# Patient Record
Sex: FEMALE | Race: WHITE | Employment: OTHER | URBAN - METROPOLITAN AREA
[De-identification: names, ages, dates, MRNs, and addresses within clinical notes are randomized per-mention and may not be internally consistent; named-entity substitution may affect disease eponyms.]

---

## 2018-03-14 ENCOUNTER — HOSPITAL ENCOUNTER (OUTPATIENT)
Dept: GENERAL RADIOLOGY | Age: 83
Discharge: HOME | End: 2018-03-14
Attending: INTERNAL MEDICINE | Admitting: INTERNAL MEDICINE

## 2018-03-14 DIAGNOSIS — R06.02 BREATH SHORTNESS: ICD-10-CM

## 2018-03-20 ENCOUNTER — HOSPITAL ENCOUNTER (OUTPATIENT)
Dept: PULMONOLOGY | Age: 83
Discharge: HOME | End: 2018-03-20
Attending: INTERNAL MEDICINE | Admitting: INTERNAL MEDICINE

## 2018-03-20 DIAGNOSIS — R06.02 SHORTNESS OF BREATH: ICD-10-CM

## 2018-11-19 ENCOUNTER — HOSPITAL ENCOUNTER (INPATIENT)
Age: 83
LOS: 2 days | Discharge: HOME | DRG: 291 | End: 2018-11-21
Attending: EMERGENCY MEDICINE | Admitting: GENERAL PRACTICE
Payer: COMMERCIAL

## 2018-11-19 ENCOUNTER — APPOINTMENT (OUTPATIENT)
Dept: GENERAL RADIOLOGY | Age: 83
DRG: 291 | End: 2018-11-19
Payer: COMMERCIAL

## 2018-11-19 DIAGNOSIS — J90 PLEURAL EFFUSION, BILATERAL: ICD-10-CM

## 2018-11-19 DIAGNOSIS — R09.02 HYPOXIA: ICD-10-CM

## 2018-11-19 DIAGNOSIS — R06.02 SHORTNESS OF BREATH: Primary | ICD-10-CM

## 2018-11-19 LAB
ALBUMIN SERPL-MCNC: 3.8 GM/DL (ref 3.4–5)
ALP BLD-CCNC: 92 IU/L (ref 40–128)
ALT SERPL-CCNC: 14 U/L (ref 10–40)
ANION GAP SERPL CALCULATED.3IONS-SCNC: 9 MMOL/L (ref 4–16)
AST SERPL-CCNC: 22 IU/L (ref 15–37)
BASOPHILS ABSOLUTE: 0 K/CU MM
BASOPHILS RELATIVE PERCENT: 0.5 % (ref 0–1)
BILIRUB SERPL-MCNC: 1.2 MG/DL (ref 0–1)
BUN BLDV-MCNC: 20 MG/DL (ref 6–23)
CALCIUM SERPL-MCNC: 9 MG/DL (ref 8.3–10.6)
CHLORIDE BLD-SCNC: 100 MMOL/L (ref 99–110)
CO2: 29 MMOL/L (ref 21–32)
CREAT SERPL-MCNC: 0.9 MG/DL (ref 0.6–1.1)
DIFFERENTIAL TYPE: ABNORMAL
EOSINOPHILS ABSOLUTE: 0.1 K/CU MM
EOSINOPHILS RELATIVE PERCENT: 2 % (ref 0–3)
GFR AFRICAN AMERICAN: >60 ML/MIN/1.73M2
GFR NON-AFRICAN AMERICAN: 58 ML/MIN/1.73M2
GLUCOSE BLD-MCNC: 96 MG/DL (ref 70–99)
HCT VFR BLD CALC: 36.4 % (ref 37–47)
HEMOGLOBIN: 11.7 GM/DL (ref 12.5–16)
IMMATURE NEUTROPHIL %: 0.2 % (ref 0–0.43)
LYMPHOCYTES ABSOLUTE: 1.2 K/CU MM
LYMPHOCYTES RELATIVE PERCENT: 22.5 % (ref 24–44)
MAGNESIUM: 2 MG/DL (ref 1.8–2.4)
MCH RBC QN AUTO: 30.2 PG (ref 27–31)
MCHC RBC AUTO-ENTMCNC: 32.1 % (ref 32–36)
MCV RBC AUTO: 93.8 FL (ref 78–100)
MONOCYTES ABSOLUTE: 0.5 K/CU MM
MONOCYTES RELATIVE PERCENT: 8.9 % (ref 0–4)
NUCLEATED RBC %: 0 %
PDW BLD-RTO: 13.2 % (ref 11.7–14.9)
PLATELET # BLD: 177 K/CU MM (ref 140–440)
PMV BLD AUTO: 9.3 FL (ref 7.5–11.1)
POTASSIUM SERPL-SCNC: 4.6 MMOL/L (ref 3.5–5.1)
PRO-BNP: 3705 PG/ML
RBC # BLD: 3.88 M/CU MM (ref 4.2–5.4)
SEGMENTED NEUTROPHILS ABSOLUTE COUNT: 3.6 K/CU MM
SEGMENTED NEUTROPHILS RELATIVE PERCENT: 65.9 % (ref 36–66)
SODIUM BLD-SCNC: 138 MMOL/L (ref 135–145)
TOTAL IMMATURE NEUTOROPHIL: 0.01 K/CU MM
TOTAL NUCLEATED RBC: 0 K/CU MM
TOTAL PROTEIN: 6.2 GM/DL (ref 6.4–8.2)
TROPONIN T: <0.01 NG/ML
WBC # BLD: 5.5 K/CU MM (ref 4–10.5)

## 2018-11-19 PROCEDURE — 2580000003 HC RX 258: Performed by: GENERAL PRACTICE

## 2018-11-19 PROCEDURE — 99285 EMERGENCY DEPT VISIT HI MDM: CPT

## 2018-11-19 PROCEDURE — 96374 THER/PROPH/DIAG INJ IV PUSH: CPT

## 2018-11-19 PROCEDURE — 94761 N-INVAS EAR/PLS OXIMETRY MLT: CPT

## 2018-11-19 PROCEDURE — 6370000000 HC RX 637 (ALT 250 FOR IP): Performed by: GENERAL PRACTICE

## 2018-11-19 PROCEDURE — 2700000000 HC OXYGEN THERAPY PER DAY

## 2018-11-19 PROCEDURE — 85025 COMPLETE CBC W/AUTO DIFF WBC: CPT

## 2018-11-19 PROCEDURE — 80053 COMPREHEN METABOLIC PANEL: CPT

## 2018-11-19 PROCEDURE — 1200000000 HC SEMI PRIVATE

## 2018-11-19 PROCEDURE — 84484 ASSAY OF TROPONIN QUANT: CPT

## 2018-11-19 PROCEDURE — 71046 X-RAY EXAM CHEST 2 VIEWS: CPT

## 2018-11-19 PROCEDURE — 83735 ASSAY OF MAGNESIUM: CPT

## 2018-11-19 PROCEDURE — 36415 COLL VENOUS BLD VENIPUNCTURE: CPT

## 2018-11-19 PROCEDURE — 83880 ASSAY OF NATRIURETIC PEPTIDE: CPT

## 2018-11-19 PROCEDURE — 93005 ELECTROCARDIOGRAM TRACING: CPT | Performed by: EMERGENCY MEDICINE

## 2018-11-19 PROCEDURE — 6360000002 HC RX W HCPCS: Performed by: EMERGENCY MEDICINE

## 2018-11-19 RX ORDER — FUROSEMIDE 10 MG/ML
20 INJECTION INTRAMUSCULAR; INTRAVENOUS 2 TIMES DAILY
Status: DISCONTINUED | OUTPATIENT
Start: 2018-11-19 | End: 2018-11-21

## 2018-11-19 RX ORDER — FUROSEMIDE 10 MG/ML
20 INJECTION INTRAMUSCULAR; INTRAVENOUS ONCE
Status: COMPLETED | OUTPATIENT
Start: 2018-11-19 | End: 2018-11-19

## 2018-11-19 RX ORDER — PROPRANOLOL HYDROCHLORIDE 40 MG/1
80 TABLET ORAL ONCE
Status: DISCONTINUED | OUTPATIENT
Start: 2018-11-19 | End: 2018-11-20

## 2018-11-19 RX ORDER — MECLIZINE HYDROCHLORIDE 25 MG/1
12.5 TABLET ORAL 3 TIMES DAILY PRN
Status: DISCONTINUED | OUTPATIENT
Start: 2018-11-19 | End: 2018-11-21 | Stop reason: HOSPADM

## 2018-11-19 RX ORDER — CITALOPRAM 20 MG/1
20 TABLET ORAL DAILY
Status: DISCONTINUED | OUTPATIENT
Start: 2018-11-20 | End: 2018-11-21 | Stop reason: HOSPADM

## 2018-11-19 RX ORDER — ACETAMINOPHEN 325 MG/1
650 TABLET ORAL EVERY 6 HOURS PRN
Status: DISCONTINUED | OUTPATIENT
Start: 2018-11-19 | End: 2018-11-21 | Stop reason: HOSPADM

## 2018-11-19 RX ORDER — SODIUM CHLORIDE 0.9 % (FLUSH) 0.9 %
10 SYRINGE (ML) INJECTION EVERY 12 HOURS SCHEDULED
Status: DISCONTINUED | OUTPATIENT
Start: 2018-11-19 | End: 2018-11-21 | Stop reason: HOSPADM

## 2018-11-19 RX ORDER — LORAZEPAM 0.5 MG/1
0.5 TABLET ORAL 2 TIMES DAILY PRN
Status: DISCONTINUED | OUTPATIENT
Start: 2018-11-19 | End: 2018-11-21 | Stop reason: HOSPADM

## 2018-11-19 RX ORDER — DOCUSATE SODIUM 100 MG/1
100 CAPSULE, LIQUID FILLED ORAL 2 TIMES DAILY PRN
Status: DISCONTINUED | OUTPATIENT
Start: 2018-11-19 | End: 2018-11-21 | Stop reason: HOSPADM

## 2018-11-19 RX ORDER — ONDANSETRON 2 MG/ML
4 INJECTION INTRAMUSCULAR; INTRAVENOUS EVERY 6 HOURS PRN
Status: DISCONTINUED | OUTPATIENT
Start: 2018-11-19 | End: 2018-11-21 | Stop reason: HOSPADM

## 2018-11-19 RX ORDER — PANTOPRAZOLE SODIUM 40 MG/1
40 TABLET, DELAYED RELEASE ORAL
Status: DISCONTINUED | OUTPATIENT
Start: 2018-11-20 | End: 2018-11-21 | Stop reason: HOSPADM

## 2018-11-19 RX ORDER — FUROSEMIDE 20 MG/1
20 TABLET ORAL
Status: ON HOLD | COMMUNITY
End: 2018-11-20

## 2018-11-19 RX ORDER — PROPRANOLOL HYDROCHLORIDE 80 MG/1
80 TABLET ORAL ONCE
Status: ON HOLD | COMMUNITY
End: 2019-03-15 | Stop reason: HOSPADM

## 2018-11-19 RX ORDER — SODIUM CHLORIDE 0.9 % (FLUSH) 0.9 %
10 SYRINGE (ML) INJECTION PRN
Status: DISCONTINUED | OUTPATIENT
Start: 2018-11-19 | End: 2018-11-21 | Stop reason: HOSPADM

## 2018-11-19 RX ADMIN — FUROSEMIDE 20 MG: 10 INJECTION, SOLUTION INTRAVENOUS at 19:51

## 2018-11-19 RX ADMIN — RIVAROXABAN 10 MG: 10 TABLET, FILM COATED ORAL at 22:49

## 2018-11-19 RX ADMIN — LORAZEPAM 0.5 MG: 0.5 TABLET ORAL at 22:49

## 2018-11-19 RX ADMIN — SODIUM CHLORIDE, PRESERVATIVE FREE 10 ML: 5 INJECTION INTRAVENOUS at 22:50

## 2018-11-19 NOTE — ED NOTES
Pt. Arrived by EMS from home with SOB. Pt family states she has been anxious lately due to another family members health issues. Pt. Family states that she has gained 9lbs in 2 weeks due to medication changes. Pt. States she was shaky and her PCP changed her medications. Family records for pt:  11/12- med change due to shakes  8/30- Ultrasound of heart   10/2 Holter Monitor   10/18 med change  10/30- off bp meds    Current med list in pt. Bin in room.       Ovidio Sosa RN  11/19/18 7892

## 2018-11-20 LAB
ALBUMIN SERPL-MCNC: 3.5 GM/DL (ref 3.4–5)
ALP BLD-CCNC: 86 IU/L (ref 40–128)
ALT SERPL-CCNC: 11 U/L (ref 10–40)
ANION GAP SERPL CALCULATED.3IONS-SCNC: 9 MMOL/L (ref 4–16)
AST SERPL-CCNC: 16 IU/L (ref 15–37)
BASOPHILS ABSOLUTE: 0 K/CU MM
BASOPHILS RELATIVE PERCENT: 0.8 % (ref 0–1)
BILIRUB SERPL-MCNC: 1.4 MG/DL (ref 0–1)
BUN BLDV-MCNC: 16 MG/DL (ref 6–23)
CALCIUM SERPL-MCNC: 8.2 MG/DL (ref 8.3–10.6)
CHLORIDE BLD-SCNC: 102 MMOL/L (ref 99–110)
CO2: 32 MMOL/L (ref 21–32)
CREAT SERPL-MCNC: 0.9 MG/DL (ref 0.6–1.1)
DIFFERENTIAL TYPE: ABNORMAL
EKG ATRIAL RATE: 98 BPM
EKG DIAGNOSIS: NORMAL
EKG Q-T INTERVAL: 362 MS
EKG QRS DURATION: 102 MS
EKG QTC CALCULATION (BAZETT): 450 MS
EKG R AXIS: 35 DEGREES
EKG T AXIS: -18 DEGREES
EKG VENTRICULAR RATE: 93 BPM
EOSINOPHILS ABSOLUTE: 0.1 K/CU MM
EOSINOPHILS RELATIVE PERCENT: 3 % (ref 0–3)
GFR AFRICAN AMERICAN: >60 ML/MIN/1.73M2
GFR NON-AFRICAN AMERICAN: 58 ML/MIN/1.73M2
GLUCOSE BLD-MCNC: 83 MG/DL (ref 70–99)
HCT VFR BLD CALC: 32.2 % (ref 37–47)
HEMOGLOBIN: 10.5 GM/DL (ref 12.5–16)
IMMATURE NEUTROPHIL %: 0.3 % (ref 0–0.43)
LV EF: 48 %
LVEF MODALITY: NORMAL
LYMPHOCYTES ABSOLUTE: 1.2 K/CU MM
LYMPHOCYTES RELATIVE PERCENT: 32.4 % (ref 24–44)
MCH RBC QN AUTO: 30.6 PG (ref 27–31)
MCHC RBC AUTO-ENTMCNC: 32.6 % (ref 32–36)
MCV RBC AUTO: 93.9 FL (ref 78–100)
MONOCYTES ABSOLUTE: 0.5 K/CU MM
MONOCYTES RELATIVE PERCENT: 12.3 % (ref 0–4)
NUCLEATED RBC %: 0 %
PDW BLD-RTO: 13.2 % (ref 11.7–14.9)
PLATELET # BLD: 151 K/CU MM (ref 140–440)
PMV BLD AUTO: 9.4 FL (ref 7.5–11.1)
POTASSIUM SERPL-SCNC: 4.1 MMOL/L (ref 3.5–5.1)
RBC # BLD: 3.43 M/CU MM (ref 4.2–5.4)
SEGMENTED NEUTROPHILS ABSOLUTE COUNT: 1.9 K/CU MM
SEGMENTED NEUTROPHILS RELATIVE PERCENT: 51.2 % (ref 36–66)
SODIUM BLD-SCNC: 143 MMOL/L (ref 135–145)
TOTAL IMMATURE NEUTOROPHIL: 0.01 K/CU MM
TOTAL NUCLEATED RBC: 0 K/CU MM
TOTAL PROTEIN: 5 GM/DL (ref 6.4–8.2)
WBC # BLD: 3.7 K/CU MM (ref 4–10.5)

## 2018-11-20 PROCEDURE — 36415 COLL VENOUS BLD VENIPUNCTURE: CPT

## 2018-11-20 PROCEDURE — 6370000000 HC RX 637 (ALT 250 FOR IP): Performed by: INTERNAL MEDICINE

## 2018-11-20 PROCEDURE — 93306 TTE W/DOPPLER COMPLETE: CPT

## 2018-11-20 PROCEDURE — 85025 COMPLETE CBC W/AUTO DIFF WBC: CPT

## 2018-11-20 PROCEDURE — 2700000000 HC OXYGEN THERAPY PER DAY

## 2018-11-20 PROCEDURE — 94761 N-INVAS EAR/PLS OXIMETRY MLT: CPT

## 2018-11-20 PROCEDURE — 93226 XTRNL ECG REC<48 HR SCAN A/R: CPT

## 2018-11-20 PROCEDURE — 2580000003 HC RX 258: Performed by: GENERAL PRACTICE

## 2018-11-20 PROCEDURE — 6360000002 HC RX W HCPCS: Performed by: GENERAL PRACTICE

## 2018-11-20 PROCEDURE — 6370000000 HC RX 637 (ALT 250 FOR IP): Performed by: GENERAL PRACTICE

## 2018-11-20 PROCEDURE — 80053 COMPREHEN METABOLIC PANEL: CPT

## 2018-11-20 PROCEDURE — 1200000000 HC SEMI PRIVATE

## 2018-11-20 PROCEDURE — 93225 XTRNL ECG REC<48 HRS REC: CPT

## 2018-11-20 RX ORDER — FUROSEMIDE 20 MG/1
20 TABLET ORAL 2 TIMES DAILY
Qty: 60 TABLET | Refills: 3
Start: 2018-11-20

## 2018-11-20 RX ORDER — PROPRANOLOL HYDROCHLORIDE 80 MG/1
80 CAPSULE, EXTENDED RELEASE ORAL DAILY
Status: DISCONTINUED | OUTPATIENT
Start: 2018-11-20 | End: 2018-11-21 | Stop reason: HOSPADM

## 2018-11-20 RX ADMIN — FUROSEMIDE 20 MG: 10 INJECTION, SOLUTION INTRAVENOUS at 08:48

## 2018-11-20 RX ADMIN — RIVAROXABAN 10 MG: 10 TABLET, FILM COATED ORAL at 18:43

## 2018-11-20 RX ADMIN — PROPRANOLOL HYDROCHLORIDE 80 MG: 80 CAPSULE, EXTENDED RELEASE ORAL at 14:33

## 2018-11-20 RX ADMIN — SODIUM CHLORIDE, PRESERVATIVE FREE 10 ML: 5 INJECTION INTRAVENOUS at 08:48

## 2018-11-20 RX ADMIN — PANTOPRAZOLE SODIUM 40 MG: 40 TABLET, DELAYED RELEASE ORAL at 08:48

## 2018-11-20 RX ADMIN — CITALOPRAM HYDROBROMIDE 20 MG: 20 TABLET ORAL at 08:48

## 2018-11-20 RX ADMIN — SODIUM CHLORIDE, PRESERVATIVE FREE 10 ML: 5 INJECTION INTRAVENOUS at 21:34

## 2018-11-20 RX ADMIN — FUROSEMIDE 20 MG: 10 INJECTION, SOLUTION INTRAVENOUS at 18:42

## 2018-11-20 ASSESSMENT — PAIN SCALES - GENERAL
PAINLEVEL_OUTOF10: 0

## 2018-11-20 NOTE — CONSULTS
done.    SOCIAL HISTORY:  Does not smoke, does not drink. She lives with family  at home. ALLERGIES:  BIAXIN. PHYSICAL EXAMINATION:  GENERAL:  The patient is awake, alert, answering questions. VITAL SIGNS:  Temperature is afebrile, pulse is in 80s to 100, blood  pressure one teens present. HEENT:  Head is normocephalic, atraumatic. Pupils are equal and  reactive to light. CHEST:  Equal expansion. LUNGS:  Clear to auscultation. No wheezing or rhonchi. HEART:  Irregularly irregular. ABDOMEN:  Soft, nontender. Bowel sounds are present. No  hepatosplenomegaly, guarding or rigidity. EXTREMITIES:  No cyanosis or clubbing noted. NEUROLOGIC:  Cranial nerves II through XII are grossly intact. DIAGNOSTIC DATA:  Electrolytes are normal, BUN is 16, creatinine 0.9. Troponin is negative. BNP 3700. LFTs are normal.  CBC is within normal  range. Chest x-ray, COPD with small pleural effusion present. IMPRESSION AND PLAN:  This is a 80-year-old female patient with a  history of moderate-to-severe aortic stenosis noted, history of having  atrial fibrillation, she comes in with shortness of breath present,  getting progressively short of breath associated with exertion, possibly  secondary to aortic stenosis itself and/or AFib with RVR present. At  this time, the plan is rate control and I ordered an echo and a Holter  monitor. I will review that and will adjust her medications. The plan  is for conservative treatment.         Sabine Wasserman MD    D: 11/20/2018 10:37:43       T: 11/20/2018 11:19:22     [email or web address]  Job#: [id number]     Doc#: [id number]    CC:

## 2018-11-20 NOTE — CARE COORDINATION
Pt has med  initial Inpatient Admission criteria for dx Hypoxia. Waiting additional results and provider documentation to further clarify admission criteria.  BOZENA, RN/CM

## 2018-11-21 VITALS
TEMPERATURE: 98.3 F | OXYGEN SATURATION: 93 % | HEART RATE: 107 BPM | BODY MASS INDEX: 26.31 KG/M2 | HEIGHT: 62 IN | WEIGHT: 143 LBS | SYSTOLIC BLOOD PRESSURE: 126 MMHG | DIASTOLIC BLOOD PRESSURE: 68 MMHG | RESPIRATION RATE: 16 BRPM

## 2018-11-21 PROBLEM — I50.31 ACUTE DIASTOLIC (CONGESTIVE) HEART FAILURE (HCC): Status: ACTIVE | Noted: 2018-11-21

## 2018-11-21 PROCEDURE — 94761 N-INVAS EAR/PLS OXIMETRY MLT: CPT

## 2018-11-21 PROCEDURE — 2580000003 HC RX 258: Performed by: GENERAL PRACTICE

## 2018-11-21 PROCEDURE — 6370000000 HC RX 637 (ALT 250 FOR IP): Performed by: GENERAL PRACTICE

## 2018-11-21 PROCEDURE — 2700000000 HC OXYGEN THERAPY PER DAY

## 2018-11-21 PROCEDURE — 6370000000 HC RX 637 (ALT 250 FOR IP): Performed by: INTERNAL MEDICINE

## 2018-11-21 RX ORDER — POTASSIUM CHLORIDE 20 MEQ/1
10 TABLET, EXTENDED RELEASE ORAL DAILY
Status: DISCONTINUED | OUTPATIENT
Start: 2018-11-21 | End: 2018-11-21 | Stop reason: HOSPADM

## 2018-11-21 RX ORDER — FUROSEMIDE 40 MG/1
40 TABLET ORAL DAILY
Status: DISCONTINUED | OUTPATIENT
Start: 2018-11-21 | End: 2018-11-21 | Stop reason: HOSPADM

## 2018-11-21 RX ADMIN — CITALOPRAM HYDROBROMIDE 20 MG: 20 TABLET ORAL at 11:17

## 2018-11-21 RX ADMIN — PANTOPRAZOLE SODIUM 40 MG: 40 TABLET, DELAYED RELEASE ORAL at 06:30

## 2018-11-21 RX ADMIN — FUROSEMIDE 40 MG: 40 TABLET ORAL at 11:17

## 2018-11-21 RX ADMIN — PROPRANOLOL HYDROCHLORIDE 80 MG: 80 CAPSULE, EXTENDED RELEASE ORAL at 11:21

## 2018-11-21 RX ADMIN — SODIUM CHLORIDE, PRESERVATIVE FREE 10 ML: 5 INJECTION INTRAVENOUS at 11:20

## 2018-11-21 RX ADMIN — POTASSIUM CHLORIDE 10 MEQ: 20 TABLET, EXTENDED RELEASE ORAL at 11:17

## 2018-11-21 ASSESSMENT — PAIN SCALES - GENERAL: PAINLEVEL_OUTOF10: 0

## 2018-11-21 NOTE — PLAN OF CARE
Problem: Falls - Risk of:  Goal: Will remain free from falls  Will remain free from falls   Outcome: Ongoing    Goal: Absence of physical injury  Absence of physical injury   Outcome: Ongoing      Problem: Airway Clearance - Ineffective:  Goal: Ability to maintain a clear airway will improve  Ability to maintain a clear airway will improve  Outcome: Ongoing

## 2018-11-21 NOTE — PROGRESS NOTES
Daily Progress Note    patient is awake alert feeling ok  No chest pain, heart rate and BP stable  afib rate stable   She is being evaluated for home oxygen  She had issues with Toprol  But is tolerating Inderal -changed to long acting   Moderate AS --conservative treatment  Keep on diuretics  On AC for afib    Echo -Summary   Left ventricular function is low normal , EF is estimated at 45-50%.  Mild concentric left ventricular hypertrophy.   Mildly dilated left atrium.   Right ventricular systolic pressure of 49 mm Hg suggestive of mild pulmonary   hypertension.   Sclerotic aortic valve with moderate stenosis.   peak velocity 3.34 m/sec-mean PG is 26mm Hg   Mild to moderate aortic insufficiency with PHT of 426 msec.   Mild to moderate mitral regurgitation is present.   Mild tricuspid regurgitation.   No evidence of any pericardial effusion.       Stress test was done in 08/2016, negative for ischemia, LV function was preserved at that time.     At that time, her metoprolol was stopped because that was making her tachycardic and she was placed on Cardizem 120 mg once a day.     PAST MEDICAL HISTORY:  Aortic stenosis, moderate to severe; history of  atrial fibrillation present. History of hypertension present.     MEDICATIONS AT HOME:  She is on Inderal 80 mg once a day, Xarelto 10 mg  a day, Celexa, Ativan, Prilosec and Antivert.       Objective:   /72   Pulse 77   Temp 97.7 °F (36.5 °C) (Oral)   Resp 16   Ht 5' 2\" (1.575 m)   Wt 143 lb (64.9 kg)   SpO2 94%   BMI 26.16 kg/m²     Intake/Output Summary (Last 24 hours) at 11/21/18 0946  Last data filed at 11/20/18 1506   Gross per 24 hour   Intake              360 ml   Output                0 ml   Net              360 ml       Medications:   Scheduled Meds:   propranolol  80 mg Oral Daily    citalopram  20 mg Oral Daily    pantoprazole  40 mg Oral QAM AC    rivaroxaban  10 mg Oral Daily    sodium chloride flush  10 mL Intravenous 2 times per day

## 2019-03-14 ENCOUNTER — HOSPITAL ENCOUNTER (OUTPATIENT)
Age: 84
Setting detail: OBSERVATION
Discharge: HOME | End: 2019-03-15
Attending: EMERGENCY MEDICINE | Admitting: GENERAL PRACTICE
Payer: COMMERCIAL

## 2019-03-14 ENCOUNTER — APPOINTMENT (OUTPATIENT)
Dept: GENERAL RADIOLOGY | Age: 84
End: 2019-03-14
Payer: COMMERCIAL

## 2019-03-14 DIAGNOSIS — R74.01 TRANSAMINITIS: ICD-10-CM

## 2019-03-14 DIAGNOSIS — R06.02 SHORTNESS OF BREATH: Primary | ICD-10-CM

## 2019-03-14 PROBLEM — R06.00 DYSPNEA: Status: ACTIVE | Noted: 2019-03-14

## 2019-03-14 LAB
ALBUMIN SERPL-MCNC: 3.9 GM/DL (ref 3.4–5)
ALP BLD-CCNC: 247 IU/L (ref 40–129)
ALT SERPL-CCNC: 115 U/L (ref 10–40)
ANION GAP SERPL CALCULATED.3IONS-SCNC: 7 MMOL/L (ref 4–16)
AST SERPL-CCNC: 81 IU/L (ref 15–37)
BASOPHILS ABSOLUTE: 0 K/CU MM
BASOPHILS RELATIVE PERCENT: 0.5 % (ref 0–1)
BILIRUB SERPL-MCNC: 1.8 MG/DL (ref 0–1)
BUN BLDV-MCNC: 17 MG/DL (ref 6–23)
CALCIUM SERPL-MCNC: 8.3 MG/DL (ref 8.3–10.6)
CHLORIDE BLD-SCNC: 101 MMOL/L (ref 99–110)
CO2: 31 MMOL/L (ref 21–32)
CREAT SERPL-MCNC: 0.9 MG/DL (ref 0.6–1.1)
DIFFERENTIAL TYPE: ABNORMAL
EOSINOPHILS ABSOLUTE: 0.2 K/CU MM
EOSINOPHILS RELATIVE PERCENT: 2.5 % (ref 0–3)
GFR AFRICAN AMERICAN: >60 ML/MIN/1.73M2
GFR NON-AFRICAN AMERICAN: 58 ML/MIN/1.73M2
GLUCOSE BLD-MCNC: 96 MG/DL (ref 70–99)
HCT VFR BLD CALC: 39.2 % (ref 37–47)
HEMOGLOBIN: 11.9 GM/DL (ref 12.5–16)
IMMATURE NEUTROPHIL %: 0.5 % (ref 0–0.43)
LYMPHOCYTES ABSOLUTE: 1 K/CU MM
LYMPHOCYTES RELATIVE PERCENT: 15.1 % (ref 24–44)
MAGNESIUM: 1.8 MG/DL (ref 1.8–2.4)
MCH RBC QN AUTO: 28.7 PG (ref 27–31)
MCHC RBC AUTO-ENTMCNC: 30.4 % (ref 32–36)
MCV RBC AUTO: 94.7 FL (ref 78–100)
MONOCYTES ABSOLUTE: 0.5 K/CU MM
MONOCYTES RELATIVE PERCENT: 8.2 % (ref 0–4)
NUCLEATED RBC %: 0 %
PDW BLD-RTO: 13.5 % (ref 11.7–14.9)
PLATELET # BLD: 160 K/CU MM (ref 140–440)
PMV BLD AUTO: 9.8 FL (ref 7.5–11.1)
POTASSIUM SERPL-SCNC: 4.4 MMOL/L (ref 3.5–5.1)
PRO-BNP: 3439 PG/ML
RBC # BLD: 4.14 M/CU MM (ref 4.2–5.4)
SEGMENTED NEUTROPHILS ABSOLUTE COUNT: 4.8 K/CU MM
SEGMENTED NEUTROPHILS RELATIVE PERCENT: 73.2 % (ref 36–66)
SODIUM BLD-SCNC: 139 MMOL/L (ref 135–145)
TOTAL IMMATURE NEUTOROPHIL: 0.03 K/CU MM
TOTAL NUCLEATED RBC: 0 K/CU MM
TOTAL PROTEIN: 6.1 GM/DL (ref 6.4–8.2)
TROPONIN T: <0.01 NG/ML
TSH HIGH SENSITIVITY: 2.8 UIU/ML (ref 0.27–4.2)
WBC # BLD: 6.5 K/CU MM (ref 4–10.5)

## 2019-03-14 PROCEDURE — 94761 N-INVAS EAR/PLS OXIMETRY MLT: CPT

## 2019-03-14 PROCEDURE — G0378 HOSPITAL OBSERVATION PER HR: HCPCS

## 2019-03-14 PROCEDURE — 1200000000 HC SEMI PRIVATE

## 2019-03-14 PROCEDURE — 83735 ASSAY OF MAGNESIUM: CPT

## 2019-03-14 PROCEDURE — 83880 ASSAY OF NATRIURETIC PEPTIDE: CPT

## 2019-03-14 PROCEDURE — 2580000003 HC RX 258: Performed by: GENERAL PRACTICE

## 2019-03-14 PROCEDURE — 85025 COMPLETE CBC W/AUTO DIFF WBC: CPT

## 2019-03-14 PROCEDURE — 99285 EMERGENCY DEPT VISIT HI MDM: CPT

## 2019-03-14 PROCEDURE — 80053 COMPREHEN METABOLIC PANEL: CPT

## 2019-03-14 PROCEDURE — 84484 ASSAY OF TROPONIN QUANT: CPT

## 2019-03-14 PROCEDURE — 93005 ELECTROCARDIOGRAM TRACING: CPT | Performed by: EMERGENCY MEDICINE

## 2019-03-14 PROCEDURE — 84443 ASSAY THYROID STIM HORMONE: CPT

## 2019-03-14 PROCEDURE — 71046 X-RAY EXAM CHEST 2 VIEWS: CPT

## 2019-03-14 PROCEDURE — 2700000000 HC OXYGEN THERAPY PER DAY

## 2019-03-14 PROCEDURE — 6370000000 HC RX 637 (ALT 250 FOR IP): Performed by: GENERAL PRACTICE

## 2019-03-14 RX ORDER — ACETAMINOPHEN 325 MG/1
650 TABLET ORAL EVERY 6 HOURS PRN
Status: DISCONTINUED | OUTPATIENT
Start: 2019-03-14 | End: 2019-03-15 | Stop reason: HOSPADM

## 2019-03-14 RX ORDER — LORAZEPAM 0.5 MG/1
0.5 TABLET ORAL 2 TIMES DAILY PRN
Status: DISCONTINUED | OUTPATIENT
Start: 2019-03-14 | End: 2019-03-15 | Stop reason: HOSPADM

## 2019-03-14 RX ORDER — DOCUSATE SODIUM 100 MG/1
100 CAPSULE, LIQUID FILLED ORAL 2 TIMES DAILY PRN
Status: DISCONTINUED | OUTPATIENT
Start: 2019-03-14 | End: 2019-03-15 | Stop reason: HOSPADM

## 2019-03-14 RX ORDER — PANTOPRAZOLE SODIUM 40 MG/1
40 TABLET, DELAYED RELEASE ORAL
Status: DISCONTINUED | OUTPATIENT
Start: 2019-03-15 | End: 2019-03-15 | Stop reason: HOSPADM

## 2019-03-14 RX ORDER — SODIUM CHLORIDE 0.9 % (FLUSH) 0.9 %
10 SYRINGE (ML) INJECTION EVERY 12 HOURS SCHEDULED
Status: DISCONTINUED | OUTPATIENT
Start: 2019-03-14 | End: 2019-03-15 | Stop reason: HOSPADM

## 2019-03-14 RX ORDER — SODIUM CHLORIDE 0.9 % (FLUSH) 0.9 %
10 SYRINGE (ML) INJECTION PRN
Status: DISCONTINUED | OUTPATIENT
Start: 2019-03-14 | End: 2019-03-15 | Stop reason: HOSPADM

## 2019-03-14 RX ORDER — ONDANSETRON 2 MG/ML
4 INJECTION INTRAMUSCULAR; INTRAVENOUS EVERY 6 HOURS PRN
Status: DISCONTINUED | OUTPATIENT
Start: 2019-03-14 | End: 2019-03-15 | Stop reason: HOSPADM

## 2019-03-14 RX ORDER — MECLIZINE HCL 12.5 MG/1
12.5 TABLET ORAL 3 TIMES DAILY PRN
Status: DISCONTINUED | OUTPATIENT
Start: 2019-03-14 | End: 2019-03-15 | Stop reason: HOSPADM

## 2019-03-14 RX ORDER — CITALOPRAM 20 MG/1
20 TABLET ORAL DAILY
Status: DISCONTINUED | OUTPATIENT
Start: 2019-03-14 | End: 2019-03-15 | Stop reason: HOSPADM

## 2019-03-14 RX ORDER — PROPRANOLOL HYDROCHLORIDE 40 MG/1
80 TABLET ORAL ONCE
Status: DISCONTINUED | OUTPATIENT
Start: 2019-03-14 | End: 2019-03-15 | Stop reason: HOSPADM

## 2019-03-14 RX ADMIN — CITALOPRAM HYDROBROMIDE 20 MG: 20 TABLET ORAL at 16:36

## 2019-03-14 RX ADMIN — RIVAROXABAN 10 MG: 10 TABLET, FILM COATED ORAL at 16:36

## 2019-03-14 RX ADMIN — SODIUM CHLORIDE, PRESERVATIVE FREE 10 ML: 5 INJECTION INTRAVENOUS at 20:54

## 2019-03-14 ASSESSMENT — PAIN SCALES - GENERAL
PAINLEVEL_OUTOF10: 0

## 2019-03-15 ENCOUNTER — APPOINTMENT (OUTPATIENT)
Dept: ULTRASOUND IMAGING | Age: 84
End: 2019-03-15
Payer: COMMERCIAL

## 2019-03-15 VITALS
OXYGEN SATURATION: 84 % | HEIGHT: 62 IN | SYSTOLIC BLOOD PRESSURE: 116 MMHG | TEMPERATURE: 97.9 F | RESPIRATION RATE: 21 BRPM | BODY MASS INDEX: 26.03 KG/M2 | HEART RATE: 70 BPM | DIASTOLIC BLOOD PRESSURE: 53 MMHG | WEIGHT: 141.44 LBS

## 2019-03-15 PROBLEM — R09.02 HYPOXIA: Status: RESOLVED | Noted: 2018-11-19 | Resolved: 2019-03-15

## 2019-03-15 LAB
ALBUMIN SERPL-MCNC: 3.5 GM/DL (ref 3.4–5)
ALP BLD-CCNC: 211 IU/L (ref 40–128)
ALT SERPL-CCNC: 85 U/L (ref 10–40)
ANION GAP SERPL CALCULATED.3IONS-SCNC: 8 MMOL/L (ref 4–16)
AST SERPL-CCNC: 51 IU/L (ref 15–37)
BASOPHILS ABSOLUTE: 0 K/CU MM
BASOPHILS RELATIVE PERCENT: 0.9 % (ref 0–1)
BILIRUB SERPL-MCNC: 1.4 MG/DL (ref 0–1)
BUN BLDV-MCNC: 18 MG/DL (ref 6–23)
CALCIUM SERPL-MCNC: 8.5 MG/DL (ref 8.3–10.6)
CHLORIDE BLD-SCNC: 103 MMOL/L (ref 99–110)
CO2: 32 MMOL/L (ref 21–32)
CREAT SERPL-MCNC: 0.8 MG/DL (ref 0.6–1.1)
DIFFERENTIAL TYPE: ABNORMAL
EOSINOPHILS ABSOLUTE: 0.2 K/CU MM
EOSINOPHILS RELATIVE PERCENT: 3.4 % (ref 0–3)
GFR AFRICAN AMERICAN: >60 ML/MIN/1.73M2
GFR NON-AFRICAN AMERICAN: >60 ML/MIN/1.73M2
GLUCOSE BLD-MCNC: 79 MG/DL (ref 70–99)
HCT VFR BLD CALC: 37.7 % (ref 37–47)
HEMOGLOBIN: 11.5 GM/DL (ref 12.5–16)
IMMATURE NEUTROPHIL %: 0.4 % (ref 0–0.43)
LYMPHOCYTES ABSOLUTE: 1.3 K/CU MM
LYMPHOCYTES RELATIVE PERCENT: 29.1 % (ref 24–44)
MCH RBC QN AUTO: 29.3 PG (ref 27–31)
MCHC RBC AUTO-ENTMCNC: 30.5 % (ref 32–36)
MCV RBC AUTO: 96.2 FL (ref 78–100)
MONOCYTES ABSOLUTE: 0.5 K/CU MM
MONOCYTES RELATIVE PERCENT: 11.4 % (ref 0–4)
NUCLEATED RBC %: 0 %
PDW BLD-RTO: 13.7 % (ref 11.7–14.9)
PLATELET # BLD: 140 K/CU MM (ref 140–440)
PMV BLD AUTO: 10.3 FL (ref 7.5–11.1)
POTASSIUM SERPL-SCNC: 4.3 MMOL/L (ref 3.5–5.1)
RBC # BLD: 3.92 M/CU MM (ref 4.2–5.4)
SEGMENTED NEUTROPHILS ABSOLUTE COUNT: 2.5 K/CU MM
SEGMENTED NEUTROPHILS RELATIVE PERCENT: 54.8 % (ref 36–66)
SODIUM BLD-SCNC: 143 MMOL/L (ref 135–145)
TOTAL IMMATURE NEUTOROPHIL: 0.02 K/CU MM
TOTAL NUCLEATED RBC: 0 K/CU MM
TOTAL PROTEIN: 5.2 GM/DL (ref 6.4–8.2)
WBC # BLD: 4.5 K/CU MM (ref 4–10.5)

## 2019-03-15 PROCEDURE — G0378 HOSPITAL OBSERVATION PER HR: HCPCS

## 2019-03-15 PROCEDURE — 6370000000 HC RX 637 (ALT 250 FOR IP): Performed by: GENERAL PRACTICE

## 2019-03-15 PROCEDURE — 93010 ELECTROCARDIOGRAM REPORT: CPT | Performed by: INTERNAL MEDICINE

## 2019-03-15 PROCEDURE — 76705 ECHO EXAM OF ABDOMEN: CPT

## 2019-03-15 PROCEDURE — 80053 COMPREHEN METABOLIC PANEL: CPT

## 2019-03-15 PROCEDURE — 85025 COMPLETE CBC W/AUTO DIFF WBC: CPT

## 2019-03-15 PROCEDURE — 2580000003 HC RX 258: Performed by: GENERAL PRACTICE

## 2019-03-15 RX ADMIN — PANTOPRAZOLE SODIUM 40 MG: 40 TABLET, DELAYED RELEASE ORAL at 06:20

## 2019-03-15 RX ADMIN — RIVAROXABAN 10 MG: 10 TABLET, FILM COATED ORAL at 10:20

## 2019-03-15 RX ADMIN — CITALOPRAM HYDROBROMIDE 20 MG: 20 TABLET ORAL at 10:20

## 2019-03-15 RX ADMIN — SODIUM CHLORIDE, PRESERVATIVE FREE 10 ML: 5 INJECTION INTRAVENOUS at 10:21

## 2019-03-15 ASSESSMENT — PAIN SCALES - GENERAL: PAINLEVEL_OUTOF10: 0

## 2019-03-19 LAB
EKG ATRIAL RATE: 131 BPM
EKG DIAGNOSIS: NORMAL
EKG Q-T INTERVAL: 358 MS
EKG QRS DURATION: 84 MS
EKG QTC CALCULATION (BAZETT): 447 MS
EKG R AXIS: -43 DEGREES
EKG T AXIS: -25 DEGREES
EKG VENTRICULAR RATE: 94 BPM

## 2019-03-27 ENCOUNTER — APPOINTMENT (OUTPATIENT)
Dept: CT IMAGING | Age: 84
End: 2019-03-27
Payer: COMMERCIAL

## 2019-03-27 ENCOUNTER — HOSPITAL ENCOUNTER (EMERGENCY)
Age: 84
Discharge: HOME | End: 2019-03-27
Payer: COMMERCIAL

## 2019-03-27 VITALS
BODY MASS INDEX: 25.21 KG/M2 | HEART RATE: 79 BPM | WEIGHT: 137 LBS | OXYGEN SATURATION: 97 % | DIASTOLIC BLOOD PRESSURE: 95 MMHG | SYSTOLIC BLOOD PRESSURE: 147 MMHG | RESPIRATION RATE: 32 BRPM | HEIGHT: 62 IN | TEMPERATURE: 97.8 F

## 2019-03-27 DIAGNOSIS — R07.81 RIB PAIN ON LEFT SIDE: ICD-10-CM
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
## 2019-03-27 LAB
ANION GAP SERPL CALCULATED.3IONS-SCNC: 8 MMOL/L (ref 4–16)
BASOPHILS ABSOLUTE: 0 K/CU MM
BASOPHILS RELATIVE PERCENT: 0.4 % (ref 0–1)
BUN BLDV-MCNC: 22 MG/DL (ref 6–23)
CALCIUM SERPL-MCNC: 8.2 MG/DL (ref 8.3–10.6)
CHLORIDE BLD-SCNC: 98 MMOL/L (ref 99–110)
CO2: 30 MMOL/L (ref 21–32)
CREAT SERPL-MCNC: 0.8 MG/DL (ref 0.6–1.1)
DIFFERENTIAL TYPE: ABNORMAL
EOSINOPHILS ABSOLUTE: 0.2 K/CU MM
EOSINOPHILS RELATIVE PERCENT: 2 % (ref 0–3)
GFR AFRICAN AMERICAN: >60 ML/MIN/1.73M2
GFR NON-AFRICAN AMERICAN: >60 ML/MIN/1.73M2
GLUCOSE BLD-MCNC: 105 MG/DL (ref 70–99)
HCT VFR BLD CALC: 39.4 % (ref 37–47)
HEMOGLOBIN: 12.3 GM/DL (ref 12.5–16)
IMMATURE NEUTROPHIL %: 0.3 % (ref 0–0.43)
LYMPHOCYTES ABSOLUTE: 1.4 K/CU MM
LYMPHOCYTES RELATIVE PERCENT: 19 % (ref 24–44)
MCH RBC QN AUTO: 29.1 PG (ref 27–31)
MCHC RBC AUTO-ENTMCNC: 31.2 % (ref 32–36)
MCV RBC AUTO: 93.4 FL (ref 78–100)
MONOCYTES ABSOLUTE: 0.7 K/CU MM
MONOCYTES RELATIVE PERCENT: 9.8 % (ref 0–4)
NUCLEATED RBC %: 0 %
PDW BLD-RTO: 13.3 % (ref 11.7–14.9)
PLATELET # BLD: 148 K/CU MM (ref 140–440)
PMV BLD AUTO: 10.2 FL (ref 7.5–11.1)
POTASSIUM SERPL-SCNC: 4.7 MMOL/L (ref 3.5–5.1)
RBC # BLD: 4.22 M/CU MM (ref 4.2–5.4)
SEGMENTED NEUTROPHILS ABSOLUTE COUNT: 5.1 K/CU MM
SEGMENTED NEUTROPHILS RELATIVE PERCENT: 68.5 % (ref 36–66)
SODIUM BLD-SCNC: 136 MMOL/L (ref 135–145)
TOTAL IMMATURE NEUTOROPHIL: 0.02 K/CU MM
TOTAL NUCLEATED RBC: 0 K/CU MM
WBC # BLD: 7.5 K/CU MM (ref 4–10.5)

## 2019-03-27 PROCEDURE — 99284 EMERGENCY DEPT VISIT MOD MDM: CPT

## 2019-03-27 PROCEDURE — 85025 COMPLETE CBC W/AUTO DIFF WBC: CPT

## 2019-03-27 PROCEDURE — 74177 CT ABD & PELVIS W/CONTRAST: CPT

## 2019-03-27 PROCEDURE — 6360000004 HC RX CONTRAST MEDICATION: Performed by: PHYSICIAN ASSISTANT

## 2019-03-27 PROCEDURE — 2580000003 HC RX 258: Performed by: PHYSICIAN ASSISTANT

## 2019-03-27 PROCEDURE — 6370000000 HC RX 637 (ALT 250 FOR IP): Performed by: PHYSICIAN ASSISTANT

## 2019-03-27 PROCEDURE — 71260 CT THORAX DX C+: CPT

## 2019-03-27 PROCEDURE — 72125 CT NECK SPINE W/O DYE: CPT

## 2019-03-27 PROCEDURE — 80048 BASIC METABOLIC PNL TOTAL CA: CPT

## 2019-03-27 PROCEDURE — 70450 CT HEAD/BRAIN W/O DYE: CPT

## 2019-03-27 RX ORDER — SODIUM CHLORIDE 0.9 % (FLUSH) 0.9 %
10 SYRINGE (ML) INJECTION 2 TIMES DAILY
Status: DISCONTINUED | OUTPATIENT
Start: 2019-03-27 | End: 2019-03-28 | Stop reason: HOSPADM

## 2019-03-27 RX ORDER — IBUPROFEN 600 MG/1
600 TABLET ORAL ONCE
Status: COMPLETED | OUTPATIENT
Start: 2019-03-27 | End: 2019-03-27

## 2019-03-27 RX ORDER — ACETAMINOPHEN 500 MG
1000 TABLET ORAL ONCE
Status: DISCONTINUED | OUTPATIENT
Start: 2019-03-27 | End: 2019-03-27

## 2019-03-27 RX ADMIN — IOPAMIDOL 75 ML: 755 INJECTION, SOLUTION INTRAVENOUS at 20:09

## 2019-03-27 RX ADMIN — SODIUM CHLORIDE, PRESERVATIVE FREE 10 ML: 5 INJECTION INTRAVENOUS at 20:10

## 2019-03-27 RX ADMIN — IBUPROFEN 600 MG: 600 TABLET, FILM COATED ORAL at 18:58

## 2019-03-27 ASSESSMENT — PAIN SCALES - GENERAL: PAINLEVEL_OUTOF10: 5

## 2019-03-27 ASSESSMENT — PAIN DESCRIPTION - ORIENTATION: ORIENTATION: LEFT

## 2019-03-27 ASSESSMENT — PAIN DESCRIPTION - PAIN TYPE: TYPE: ACUTE PAIN

## 2019-03-27 ASSESSMENT — PAIN DESCRIPTION - LOCATION: LOCATION: RIB CAGE

## 2019-05-21 ENCOUNTER — HOSPITAL ENCOUNTER (OUTPATIENT)
Age: 84
Setting detail: SPECIMEN
Discharge: HOME | End: 2019-05-21
Payer: COMMERCIAL

## 2019-05-21 LAB
ALBUMIN SERPL-MCNC: 3.7 GM/DL (ref 3.4–5)
ALP BLD-CCNC: 233 IU/L (ref 40–128)
ALT SERPL-CCNC: 8 U/L (ref 10–40)
ANION GAP SERPL CALCULATED.3IONS-SCNC: 10 MMOL/L (ref 4–16)
AST SERPL-CCNC: 15 IU/L (ref 15–37)
BILIRUB SERPL-MCNC: 0.8 MG/DL (ref 0–1)
BUN BLDV-MCNC: 24 MG/DL (ref 6–23)
CALCIUM SERPL-MCNC: 8.6 MG/DL (ref 8.3–10.6)
CHLORIDE BLD-SCNC: 102 MMOL/L (ref 99–110)
CO2: 29 MMOL/L (ref 21–32)
CREAT SERPL-MCNC: 1 MG/DL (ref 0.6–1.1)
GFR AFRICAN AMERICAN: >60 ML/MIN/1.73M2
GFR NON-AFRICAN AMERICAN: 52 ML/MIN/1.73M2
GLUCOSE BLD-MCNC: 84 MG/DL (ref 70–99)
HCT VFR BLD CALC: 38.9 % (ref 37–47)
HEMOGLOBIN: 11.7 GM/DL (ref 12.5–16)
MCH RBC QN AUTO: 29.3 PG (ref 27–31)
MCHC RBC AUTO-ENTMCNC: 30.1 % (ref 32–36)
MCV RBC AUTO: 97.3 FL (ref 78–100)
PDW BLD-RTO: 14.6 % (ref 11.7–14.9)
PLATELET # BLD: 143 K/CU MM (ref 140–440)
PMV BLD AUTO: 10.8 FL (ref 7.5–11.1)
POTASSIUM SERPL-SCNC: 3.8 MMOL/L (ref 3.5–5.1)
RBC # BLD: 4 M/CU MM (ref 4.2–5.4)
SODIUM BLD-SCNC: 141 MMOL/L (ref 135–145)
TOTAL PROTEIN: 5.2 GM/DL (ref 6.4–8.2)
WBC # BLD: 5.5 K/CU MM (ref 4–10.5)

## 2019-05-21 PROCEDURE — 36415 COLL VENOUS BLD VENIPUNCTURE: CPT

## 2019-05-21 PROCEDURE — 80053 COMPREHEN METABOLIC PANEL: CPT

## 2019-05-21 PROCEDURE — 85027 COMPLETE CBC AUTOMATED: CPT

## 2019-10-13 ENCOUNTER — APPOINTMENT (OUTPATIENT)
Dept: GENERAL RADIOLOGY | Age: 84
End: 2019-10-13
Payer: COMMERCIAL

## 2019-10-13 ENCOUNTER — HOSPITAL ENCOUNTER (EMERGENCY)
Age: 84
Discharge: HOME | End: 2019-10-13
Attending: EMERGENCY MEDICINE
Payer: COMMERCIAL

## 2019-10-13 VITALS
RESPIRATION RATE: 16 BRPM | OXYGEN SATURATION: 93 % | TEMPERATURE: 98.3 F | DIASTOLIC BLOOD PRESSURE: 69 MMHG | WEIGHT: 122 LBS | BODY MASS INDEX: 22.45 KG/M2 | HEIGHT: 62 IN | SYSTOLIC BLOOD PRESSURE: 124 MMHG | HEART RATE: 88 BPM
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
## 2019-10-13 DIAGNOSIS — M25.551 RIGHT HIP PAIN: ICD-10-CM

## 2019-10-13 LAB
ALBUMIN SERPL-MCNC: 3.8 GM/DL (ref 3.4–5)
ALP BLD-CCNC: 94 IU/L (ref 40–129)
ALT SERPL-CCNC: 10 U/L (ref 10–40)
ANION GAP SERPL CALCULATED.3IONS-SCNC: 8 MMOL/L (ref 4–16)
APTT: 32.5 SECONDS (ref 21.2–33)
AST SERPL-CCNC: 20 IU/L (ref 15–37)
BASOPHILS ABSOLUTE: 0 K/CU MM
BASOPHILS RELATIVE PERCENT: 0.4 % (ref 0–1)
BILIRUB SERPL-MCNC: 1.9 MG/DL (ref 0–1)
BUN BLDV-MCNC: 22 MG/DL (ref 6–23)
CALCIUM SERPL-MCNC: 8.5 MG/DL (ref 8.3–10.6)
CHLORIDE BLD-SCNC: 103 MMOL/L (ref 99–110)
CO2: 29 MMOL/L (ref 21–32)
CREAT SERPL-MCNC: 0.8 MG/DL (ref 0.6–1.1)
DIFFERENTIAL TYPE: ABNORMAL
EOSINOPHILS ABSOLUTE: 0.1 K/CU MM
EOSINOPHILS RELATIVE PERCENT: 1.2 % (ref 0–3)
GFR AFRICAN AMERICAN: >60 ML/MIN/1.73M2
GFR NON-AFRICAN AMERICAN: >60 ML/MIN/1.73M2
GLUCOSE BLD-MCNC: 102 MG/DL (ref 70–99)
HCT VFR BLD CALC: 39.8 % (ref 37–47)
HEMOGLOBIN: 12.3 GM/DL (ref 12.5–16)
IMMATURE NEUTROPHIL %: 0.4 % (ref 0–0.43)
INR BLD: 1.63 INDEX
LYMPHOCYTES ABSOLUTE: 1.1 K/CU MM
LYMPHOCYTES RELATIVE PERCENT: 14.4 % (ref 24–44)
MCH RBC QN AUTO: 29 PG (ref 27–31)
MCHC RBC AUTO-ENTMCNC: 30.9 % (ref 32–36)
MCV RBC AUTO: 93.9 FL (ref 78–100)
MONOCYTES ABSOLUTE: 0.5 K/CU MM
MONOCYTES RELATIVE PERCENT: 6.4 % (ref 0–4)
NUCLEATED RBC %: 0 %
PDW BLD-RTO: 13.1 % (ref 11.7–14.9)
PLATELET # BLD: 123 K/CU MM (ref 140–440)
PMV BLD AUTO: 10.7 FL (ref 7.5–11.1)
POTASSIUM SERPL-SCNC: 3.9 MMOL/L (ref 3.5–5.1)
PRO-BNP: 3252 PG/ML
PROTHROMBIN TIME: 18.7 SECONDS (ref 11.7–14.5)
RBC # BLD: 4.24 M/CU MM (ref 4.2–5.4)
REASON FOR REJECTION: NORMAL
REASON FOR REJECTION: NORMAL
REJECTED TEST: NORMAL
SEGMENTED NEUTROPHILS ABSOLUTE COUNT: 5.8 K/CU MM
SEGMENTED NEUTROPHILS RELATIVE PERCENT: 77.2 % (ref 36–66)
SODIUM BLD-SCNC: 140 MMOL/L (ref 135–145)
TOTAL IMMATURE NEUTOROPHIL: 0.03 K/CU MM
TOTAL NUCLEATED RBC: 0 K/CU MM
TOTAL PROTEIN: 6.1 GM/DL (ref 6.4–8.2)
WBC # BLD: 7.5 K/CU MM (ref 4–10.5)

## 2019-10-13 PROCEDURE — 6360000002 HC RX W HCPCS: Performed by: EMERGENCY MEDICINE

## 2019-10-13 PROCEDURE — 99284 EMERGENCY DEPT VISIT MOD MDM: CPT

## 2019-10-13 PROCEDURE — 96374 THER/PROPH/DIAG INJ IV PUSH: CPT

## 2019-10-13 PROCEDURE — 71045 X-RAY EXAM CHEST 1 VIEW: CPT

## 2019-10-13 PROCEDURE — 85610 PROTHROMBIN TIME: CPT

## 2019-10-13 PROCEDURE — 72170 X-RAY EXAM OF PELVIS: CPT

## 2019-10-13 PROCEDURE — 85025 COMPLETE CBC W/AUTO DIFF WBC: CPT

## 2019-10-13 PROCEDURE — 94761 N-INVAS EAR/PLS OXIMETRY MLT: CPT

## 2019-10-13 PROCEDURE — 73552 X-RAY EXAM OF FEMUR 2/>: CPT

## 2019-10-13 PROCEDURE — 80053 COMPREHEN METABOLIC PANEL: CPT

## 2019-10-13 PROCEDURE — 96375 TX/PRO/DX INJ NEW DRUG ADDON: CPT

## 2019-10-13 PROCEDURE — 6370000000 HC RX 637 (ALT 250 FOR IP): Performed by: EMERGENCY MEDICINE

## 2019-10-13 PROCEDURE — 85730 THROMBOPLASTIN TIME PARTIAL: CPT

## 2019-10-13 PROCEDURE — 93005 ELECTROCARDIOGRAM TRACING: CPT | Performed by: PHYSICIAN ASSISTANT

## 2019-10-13 PROCEDURE — 93010 ELECTROCARDIOGRAM REPORT: CPT | Performed by: INTERNAL MEDICINE

## 2019-10-13 PROCEDURE — 94640 AIRWAY INHALATION TREATMENT: CPT

## 2019-10-13 PROCEDURE — 86900 BLOOD TYPING SEROLOGIC ABO: CPT

## 2019-10-13 PROCEDURE — 83880 ASSAY OF NATRIURETIC PEPTIDE: CPT

## 2019-10-13 RX ORDER — TRAMADOL HYDROCHLORIDE 50 MG/1
50 TABLET ORAL EVERY 6 HOURS PRN
Qty: 10 TABLET | Refills: 0 | Status: SHIPPED | OUTPATIENT
Start: 2019-10-13 | End: 2019-10-16

## 2019-10-13 RX ORDER — ONDANSETRON 2 MG/ML
4 INJECTION INTRAMUSCULAR; INTRAVENOUS ONCE
Status: DISCONTINUED | OUTPATIENT
Start: 2019-10-13 | End: 2019-10-13 | Stop reason: HOSPADM

## 2019-10-13 RX ORDER — TRAMADOL HYDROCHLORIDE 50 MG/1
50 TABLET ORAL ONCE
Status: COMPLETED | OUTPATIENT
Start: 2019-10-13 | End: 2019-10-13

## 2019-10-13 RX ORDER — FENTANYL CITRATE 50 UG/ML
50 INJECTION, SOLUTION INTRAMUSCULAR; INTRAVENOUS ONCE
Status: DISCONTINUED | OUTPATIENT
Start: 2019-10-13 | End: 2019-10-13 | Stop reason: HOSPADM

## 2019-10-13 RX ORDER — IPRATROPIUM BROMIDE AND ALBUTEROL SULFATE 2.5; .5 MG/3ML; MG/3ML
1 SOLUTION RESPIRATORY (INHALATION) ONCE
Status: COMPLETED | OUTPATIENT
Start: 2019-10-13 | End: 2019-10-13

## 2019-10-13 RX ORDER — FUROSEMIDE 10 MG/ML
20 INJECTION INTRAMUSCULAR; INTRAVENOUS ONCE
Status: COMPLETED | OUTPATIENT
Start: 2019-10-13 | End: 2019-10-13

## 2019-10-13 RX ADMIN — IPRATROPIUM BROMIDE AND ALBUTEROL SULFATE 1 AMPULE: .5; 3 SOLUTION RESPIRATORY (INHALATION) at 08:06

## 2019-10-13 RX ADMIN — FUROSEMIDE 20 MG: 10 INJECTION, SOLUTION INTRAVENOUS at 10:07

## 2019-10-13 RX ADMIN — TRAMADOL HYDROCHLORIDE 50 MG: 50 TABLET, COATED ORAL at 10:06

## 2019-10-13 ASSESSMENT — PAIN SCALES - GENERAL
PAINLEVEL_OUTOF10: 3
PAINLEVEL_OUTOF10: 7

## 2019-10-17 LAB
EKG ATRIAL RATE: 78 BPM
EKG DIAGNOSIS: NORMAL
EKG Q-T INTERVAL: 372 MS
EKG QRS DURATION: 84 MS
EKG QTC CALCULATION (BAZETT): 452 MS
EKG R AXIS: 26 DEGREES
EKG T AXIS: -22 DEGREES
EKG VENTRICULAR RATE: 89 BPM

## 2021-01-01 ENCOUNTER — APPOINTMENT (OUTPATIENT)
Dept: CT IMAGING | Age: 86
End: 2021-01-01
Payer: COMMERCIAL

## 2021-01-01 ENCOUNTER — HOSPITAL ENCOUNTER (EMERGENCY)
Age: 86
Discharge: HOME | End: 2021-08-22
Attending: EMERGENCY MEDICINE
Payer: COMMERCIAL

## 2021-01-01 ENCOUNTER — APPOINTMENT (OUTPATIENT)
Dept: GENERAL RADIOLOGY | Age: 86
End: 2021-01-01
Payer: COMMERCIAL

## 2021-01-01 VITALS
TEMPERATURE: 97.7 F | WEIGHT: 124 LBS | DIASTOLIC BLOOD PRESSURE: 101 MMHG | HEART RATE: 92 BPM | OXYGEN SATURATION: 98 % | SYSTOLIC BLOOD PRESSURE: 132 MMHG | RESPIRATION RATE: 15 BRPM | BODY MASS INDEX: 22.68 KG/M2

## 2021-01-01 DIAGNOSIS — S22.000A COMPRESSION FRACTURE OF THORACIC VERTEBRA, INITIAL ENCOUNTER, UNSPECIFIED THORACIC VERTEBRAL LEVEL: Primary | ICD-10-CM

## 2021-01-01 DIAGNOSIS — R00.0 TACHYCARDIA: ICD-10-CM

## 2021-01-01 DIAGNOSIS — R77.8 ELEVATED TROPONIN: ICD-10-CM

## 2021-01-01 LAB
ALBUMIN SERPL-MCNC: 3.9 GM/DL (ref 3.4–5)
ALP BLD-CCNC: 115 IU/L (ref 40–129)
ALT SERPL-CCNC: 57 U/L (ref 10–40)
ANION GAP SERPL CALCULATED.3IONS-SCNC: 12 MMOL/L (ref 4–16)
AST SERPL-CCNC: 76 IU/L (ref 15–37)
BACTERIA: ABNORMAL /HPF
BASE EXCESS MIXED: 2.2 (ref 0–2.3)
BASE EXCESS: ABNORMAL (ref 0–2.4)
BASOPHILS ABSOLUTE: 0 K/CU MM
BASOPHILS RELATIVE PERCENT: 0.3 % (ref 0–1)
BILIRUB SERPL-MCNC: 3.3 MG/DL (ref 0–1)
BILIRUBIN URINE: NEGATIVE MG/DL
BLOOD, URINE: NEGATIVE
BUN BLDV-MCNC: 37 MG/DL (ref 6–23)
CALCIUM SERPL-MCNC: 8.5 MG/DL (ref 8.3–10.6)
CAST TYPE: ABNORMAL /HPF
CHLORIDE BLD-SCNC: 99 MMOL/L (ref 99–110)
CLARITY: ABNORMAL
CO2 CONTENT: 31.5 MMOL/L (ref 19–24)
CO2: 28 MMOL/L (ref 21–32)
COLOR: YELLOW
CREAT SERPL-MCNC: 1.1 MG/DL (ref 0.6–1.1)
CRYSTAL TYPE: ABNORMAL /HPF
CULTURE: NORMAL
CULTURE: NORMAL
DIFFERENTIAL TYPE: ABNORMAL
EKG ATRIAL RATE: 126 BPM
EKG DIAGNOSIS: NORMAL
EKG Q-T INTERVAL: 324 MS
EKG QRS DURATION: 90 MS
EKG QTC CALCULATION (BAZETT): 469 MS
EKG R AXIS: 7 DEGREES
EKG T AXIS: -23 DEGREES
EKG VENTRICULAR RATE: 126 BPM
EOSINOPHILS ABSOLUTE: 0 K/CU MM
EOSINOPHILS RELATIVE PERCENT: 0.4 % (ref 0–3)
EPITHELIAL CELLS, UA: 2 /HPF
GFR AFRICAN AMERICAN: 56 ML/MIN/1.73M2
GFR NON-AFRICAN AMERICAN: 46 ML/MIN/1.73M2
GLUCOSE BLD-MCNC: 102 MG/DL (ref 70–99)
GLUCOSE, URINE: NEGATIVE MG/DL
HCO3 VENOUS: 29.7 MMOL/L (ref 19–25)
HCT VFR BLD CALC: 40.7 % (ref 37–47)
HEMOGLOBIN: 12.6 GM/DL (ref 12.5–16)
IMMATURE NEUTROPHIL %: 0.4 % (ref 0–0.43)
KETONES, URINE: 5 MG/DL
LACTATE: 2.6 MMOL/L (ref 0.4–2)
LEUKOCYTE ESTERASE, URINE: NEGATIVE
LIPASE: 10 IU/L (ref 13–60)
LYMPHOCYTES ABSOLUTE: 1.2 K/CU MM
LYMPHOCYTES RELATIVE PERCENT: 15 % (ref 24–44)
Lab: NORMAL
Lab: NORMAL
MCH RBC QN AUTO: 30.1 PG (ref 27–31)
MCHC RBC AUTO-ENTMCNC: 31 % (ref 32–36)
MCV RBC AUTO: 97.4 FL (ref 78–100)
MONOCYTES ABSOLUTE: 0.7 K/CU MM
MONOCYTES RELATIVE PERCENT: 8.5 % (ref 0–4)
NITRITE URINE, QUANTITATIVE: NEGATIVE
O2 SAT, VEN: 46.5 % (ref 50–70)
PCO2, VEN: 58.1 MMHG (ref 38–52)
PDW BLD-RTO: 14.6 % (ref 11.7–14.9)
PH VENOUS: 7.32 (ref 7.32–7.42)
PH, URINE: 6 (ref 5–8)
PLATELET # BLD: 136 K/CU MM (ref 140–440)
PMV BLD AUTO: 10 FL (ref 7.5–11.1)
PO2, VEN: 28.3 MMHG (ref 28–48)
POTASSIUM SERPL-SCNC: 4.9 MMOL/L (ref 3.5–5.1)
PRO-BNP: ABNORMAL PG/ML
PROTEIN UA: ABNORMAL MG/DL
RBC # BLD: 4.18 M/CU MM (ref 4.2–5.4)
RBC URINE: 1 /HPF (ref 0–6)
SEGMENTED NEUTROPHILS ABSOLUTE COUNT: 5.8 K/CU MM
SEGMENTED NEUTROPHILS RELATIVE PERCENT: 75.4 % (ref 36–66)
SODIUM BLD-SCNC: 139 MMOL/L (ref 135–145)
SOURCE, BLOOD GAS: ABNORMAL
SPECIFIC GRAVITY UA: 1.03 (ref 1–1.03)
SPECIMEN: NORMAL
SPECIMEN: NORMAL
TOTAL IMMATURE NEUTOROPHIL: 0.03 K/CU MM
TOTAL PROTEIN: 6.1 GM/DL (ref 6.4–8.2)
TROPONIN T: 0.02 NG/ML
UROBILINOGEN, URINE: 1 MG/DL (ref 0.2–1)
WBC # BLD: 7.7 K/CU MM (ref 4–10.5)
WBC UA: 2 /HPF (ref 0–5)

## 2021-01-01 PROCEDURE — 80053 COMPREHEN METABOLIC PANEL: CPT

## 2021-01-01 PROCEDURE — 82805 BLOOD GASES W/O2 SATURATION: CPT

## 2021-01-01 PROCEDURE — 70450 CT HEAD/BRAIN W/O DYE: CPT

## 2021-01-01 PROCEDURE — 83880 ASSAY OF NATRIURETIC PEPTIDE: CPT

## 2021-01-01 PROCEDURE — 2580000003 HC RX 258: Performed by: EMERGENCY MEDICINE

## 2021-01-01 PROCEDURE — 96376 TX/PRO/DX INJ SAME DRUG ADON: CPT

## 2021-01-01 PROCEDURE — 2500000003 HC RX 250 WO HCPCS: Performed by: EMERGENCY MEDICINE

## 2021-01-01 PROCEDURE — 76376 3D RENDER W/INTRP POSTPROCES: CPT

## 2021-01-01 PROCEDURE — 81001 URINALYSIS AUTO W/SCOPE: CPT

## 2021-01-01 PROCEDURE — 96375 TX/PRO/DX INJ NEW DRUG ADDON: CPT

## 2021-01-01 PROCEDURE — 93010 ELECTROCARDIOGRAM REPORT: CPT | Performed by: INTERNAL MEDICINE

## 2021-01-01 PROCEDURE — 6360000002 HC RX W HCPCS: Performed by: EMERGENCY MEDICINE

## 2021-01-01 PROCEDURE — 74176 CT ABD & PELVIS W/O CONTRAST: CPT

## 2021-01-01 PROCEDURE — 93005 ELECTROCARDIOGRAM TRACING: CPT | Performed by: EMERGENCY MEDICINE

## 2021-01-01 PROCEDURE — 83690 ASSAY OF LIPASE: CPT

## 2021-01-01 PROCEDURE — 84484 ASSAY OF TROPONIN QUANT: CPT

## 2021-01-01 PROCEDURE — 99283 EMERGENCY DEPT VISIT LOW MDM: CPT

## 2021-01-01 PROCEDURE — 85025 COMPLETE CBC W/AUTO DIFF WBC: CPT

## 2021-01-01 PROCEDURE — 96374 THER/PROPH/DIAG INJ IV PUSH: CPT

## 2021-01-01 PROCEDURE — 96361 HYDRATE IV INFUSION ADD-ON: CPT

## 2021-01-01 PROCEDURE — 72125 CT NECK SPINE W/O DYE: CPT

## 2021-01-01 PROCEDURE — 83605 ASSAY OF LACTIC ACID: CPT

## 2021-01-01 PROCEDURE — 87040 BLOOD CULTURE FOR BACTERIA: CPT

## 2021-01-01 PROCEDURE — 71250 CT THORAX DX C-: CPT

## 2021-01-01 RX ORDER — METOPROLOL TARTRATE 5 MG/5ML
5 INJECTION INTRAVENOUS ONCE
Status: COMPLETED | OUTPATIENT
Start: 2021-01-01 | End: 2021-01-01

## 2021-01-01 RX ORDER — ONDANSETRON 2 MG/ML
4 INJECTION INTRAMUSCULAR; INTRAVENOUS ONCE
Status: COMPLETED | OUTPATIENT
Start: 2021-01-01 | End: 2021-01-01

## 2021-01-01 RX ORDER — VENLAFAXINE 37.5 MG/1
37.5 TABLET ORAL DAILY
COMMUNITY

## 2021-01-01 RX ORDER — 0.9 % SODIUM CHLORIDE 0.9 %
500 INTRAVENOUS SOLUTION INTRAVENOUS ONCE
Status: COMPLETED | OUTPATIENT
Start: 2021-01-01 | End: 2021-01-01

## 2021-01-01 RX ORDER — ESCITALOPRAM OXALATE 20 MG/1
20 TABLET ORAL DAILY
COMMUNITY

## 2021-01-01 RX ORDER — QUETIAPINE FUMARATE 25 MG/1
25 TABLET, FILM COATED ORAL NIGHTLY
COMMUNITY

## 2021-01-01 RX ADMIN — METOPROLOL TARTRATE 5 MG: 5 INJECTION INTRAVENOUS at 14:45

## 2021-01-01 RX ADMIN — ONDANSETRON 4 MG: 2 INJECTION INTRAMUSCULAR; INTRAVENOUS at 13:14

## 2021-01-01 RX ADMIN — SODIUM CHLORIDE 500 ML: 9 INJECTION, SOLUTION INTRAVENOUS at 13:13

## 2021-01-01 RX ADMIN — ONDANSETRON 4 MG: 2 INJECTION INTRAMUSCULAR; INTRAVENOUS at 13:53

## 2021-01-01 ASSESSMENT — ENCOUNTER SYMPTOMS
EYE DISCHARGE: 0
SORE THROAT: 0
SHORTNESS OF BREATH: 0
EYE PAIN: 0
ABDOMINAL PAIN: 0
NAUSEA: 0
RHINORRHEA: 0
VOMITING: 0
BACK PAIN: 1
COUGH: 0

## 2021-01-01 ASSESSMENT — PAIN DESCRIPTION - LOCATION: LOCATION: BACK

## 2021-01-01 ASSESSMENT — PAIN DESCRIPTION - ORIENTATION: ORIENTATION: LOWER

## 2021-08-22 NOTE — ED TRIAGE NOTES
Family reports pt fell while doing laundry in the basement on Friday. Denies hitting her head, c/o low back pain.

## 2021-08-22 NOTE — ED PROVIDER NOTES
[address] [facility] ENCOUNTER      Pt Name: Mavis Modi  MRN: [id number]  [place] 2/18/1925  Date of evaluation: 8/22/2021  Provider: Cristiano Newton MD    CHIEF COMPLAINT       Chief Complaint   Patient presents with    Fall         HISTORY OF PRESENT ILLNESS      Mavis Modi is a 80 y.o. female who presents to the emergency department  for   Chief Complaint   Patient presents with    Fall       80year-old female presents for back pain and nausea. She had a mechanical fall 2 days ago in her home. She was in the basement doing laundry when she went to reach for pants anger and fell. No loss of consciousness. She is not amnestic about events. She was later found by family member. She did not undergo any evaluation in an ED or otherwise. Over the past 2 days she has had some generalized weakness and not been as active as she typically is. She has not taken her home medications. She is also poor oral intake given the nausea. She denies any remarkable abdominal pain. Denies any difficulty breathing. She does endorse mild pain in the low back. Denies any saddle paresthesias. No bowel or urinary incontinence. She presents to the emergency department with family. They are concerned about her level of nausea that she has not eaten or taken her medications in 2 days. In the emergency department, she is alert and oriented. GCS of 15. She is moving all extremity spontaneously. No signs of respiratory distress. She has ambulated since the fall. She is on Xarelto for history of paroxysmal atrial fibrillation. Nursing Notes, Triage Notes & Vital Signs were reviewed. REVIEW OF SYSTEMS    (2-9 systems for level 4, 10 or more for level 5)     Review of Systems   Constitutional: Negative for chills and fever. HENT: Negative for congestion, rhinorrhea and sore throat. Eyes: Negative for pain and discharge.    Respiratory: Negative for cough and shortness of breath. Cardiovascular: Negative for chest pain and palpitations. Gastrointestinal: Negative for abdominal pain, nausea and vomiting. Endocrine: Negative for polydipsia and polyuria. Genitourinary: Negative for dysuria and flank pain. Musculoskeletal: Positive for back pain. Negative for neck pain. Skin: Negative for pallor and wound. Neurological: Negative for dizziness, facial asymmetry, light-headedness, numbness and headaches. Psychiatric/Behavioral: Negative for confusion. Except as noted above the remainder of the review of systems was reviewed and negative. PAST MEDICAL HISTORY     Past Medical History:   Diagnosis Date    Arthritis     Atrial fibrillation ([facility] [address]     CAD (coronary artery disease)     Chronic kidney disease     GERD (gastroesophageal reflux disease)     Hypertension     Mitral valve prolapse     Psychiatric problem        Prior to Admission medications    Medication Sig Start Date End Date Taking? Authorizing Provider   escitalopram (LEXAPRO) 20 MG tablet Take 20 mg by mouth daily   Yes Historical Provider, MD   mirabegron (MYRBETRIQ) 25 MG TB24 Take 25 mg by mouth daily   Yes Historical Provider, MD   QUEtiapine (SEROQUEL) 25 MG tablet Take 25 mg by mouth 2 times daily   Yes Historical Provider, MD   venlafaxine (EFFEXOR) 37.5 MG tablet Take 37.5 mg by mouth 3 times daily   Yes Historical Provider, MD   furosemide (LASIX) 20 MG tablet Take 1 tablet by mouth 2 times daily Pt has this medications at home.  11/20/18  Yes Steven Garcia MD   rivaroxaban (XARELTO) 10 MG TABS tablet Take 10 mg by mouth daily   Yes Historical Provider, MD   omeprazole (PRILOSEC) 20 MG delayed release capsule Take 20 mg by mouth daily    Yes Historical Provider, MD   docusate sodium (COLACE) 100 MG capsule Take 100 mg by mouth 2 times daily as needed for Constipation    Historical Provider, MD   citalopram (CELEXA) 20 MG tablet Take 20 mg by mouth daily Historical Provider, MD   meclizine (ANTIVERT) 12.5 MG tablet Take 12.5 mg by mouth 3 times daily as needed. Historical Provider, MD   lorazepam (ATIVAN) 0.5 MG tablet Take 0.5 mg by mouth 2 times daily as needed     Historical Provider, MD        Patient Active Problem List   Diagnosis    Hypertension    Intermittent atrial fibrillation (HCC)    Anemia    Nausea without vomiting    Acute diastolic (congestive) heart failure (HCC)    Dyspnea         SURGICAL HISTORY       Past Surgical History:   Procedure Laterality Date    CATARACT REMOVAL      CHOLECYSTECTOMY      FRACTURE SURGERY      HYSTERECTOMY      JOINT REPLACEMENT      ORIF FEMUR DECOMPRESSION Left 12/02/2013         CURRENT MEDICATIONS       Previous Medications    CITALOPRAM (CELEXA) 20 MG TABLET    Take 20 mg by mouth daily     DOCUSATE SODIUM (COLACE) 100 MG CAPSULE    Take 100 mg by mouth 2 times daily as needed for Constipation    ESCITALOPRAM (LEXAPRO) 20 MG TABLET    Take 20 mg by mouth daily    FUROSEMIDE (LASIX) 20 MG TABLET    Take 1 tablet by mouth 2 times daily Pt has this medications at home. LORAZEPAM (ATIVAN) 0.5 MG TABLET    Take 0.5 mg by mouth 2 times daily as needed     MECLIZINE (ANTIVERT) 12.5 MG TABLET    Take 12.5 mg by mouth 3 times daily as needed.       MIRABEGRON (MYRBETRIQ) 25 MG TB24    Take 25 mg by mouth daily    OMEPRAZOLE (PRILOSEC) 20 MG DELAYED RELEASE CAPSULE    Take 20 mg by mouth daily     QUETIAPINE (SEROQUEL) 25 MG TABLET    Take 25 mg by mouth 2 times daily    RIVAROXABAN (XARELTO) 10 MG TABS TABLET    Take 10 mg by mouth daily    VENLAFAXINE (EFFEXOR) 37.5 MG TABLET    Take 37.5 mg by mouth 3 times daily       ALLERGIES     Biaxin [clarithromycin] and Codeine    FAMILY HISTORY       Family History   Problem Relation Age of Onset    Heart Disease Mother     Heart Disease Father     Heart Disease Sister     Heart Disease Brother           SOCIAL HISTORY       Social History     Socioeconomic History    Marital status:      Spouse name: Not on file    Number of children: Not on file    Years of education: Not on file    Highest education level: Not on file   Occupational History    Not on file   Tobacco Use    Smoking status: Former Smoker    Smokeless tobacco: Never Used   Substance and Sexual Activity    Alcohol use: No    Drug use: No    Sexual activity: Not Currently   Other Topics Concern    Not on file   Social History Narrative    Not on file     Social Determinants of Health     Financial Resource Strain:     Difficulty of Paying Living Expenses:    Food Insecurity:     Worried About Running Out of Food in the Last Year:     [address] in the Last Year:    Transportation Needs:     Lack of Transportation (Medical):  Lack of Transportation (Non-Medical):    Physical Activity:     Days of Exercise per Week:     Minutes of Exercise per Session:    Stress:     Feeling of Stress :    Social Connections:     Frequency of Communication with Friends and Family:     Frequency of Social Gatherings with Friends and Family:     Attends [employer] Services:     Active Member of Clubs or Organizations:     Attends Club or Organization Meetings:     Marital Status:    Intimate Partner Violence:     Fear of Current or Ex-Partner:     Emotionally Abused:     Physically Abused:     Sexually Abused:        SCREENINGS               PHYSICAL EXAM    (up to 7 for level 4, 8 or more for level 5)     ED Triage Vitals   BP Temp Temp Source Pulse Resp SpO2 Height Weight   08/22/21 1156 08/22/21 1155 08/22/21 1155 08/22/21 1157 08/22/21 1157 08/22/21 1158 -- 08/22/21 1157   (!) 105/97 97.7 °F (36.5 °C) Infrared 126 14 90 %  124 lb (56.2 kg)       Physical Exam  Vitals reviewed. Constitutional:       Appearance: She is not toxic-appearing or diaphoretic. HENT:      Head: Normocephalic and atraumatic. Nose: No congestion or rhinorrhea.       Mouth/Throat:      Mouth: Mucous membranes are moist.      Pharynx: No oropharyngeal exudate or posterior oropharyngeal erythema. Eyes:      General:         Right eye: No discharge. Left eye: No discharge. Extraocular Movements: Extraocular movements intact. Pupils: Pupils are equal, round, and reactive to light. Neck:      Comments: No midline cervical spine tenderness to palpation  Cardiovascular:      Rate and Rhythm: Tachycardia present. Pulses: Normal pulses. Heart sounds: No friction rub. No gallop. Abdominal:      Palpations: Abdomen is soft. Tenderness: There is no abdominal tenderness. There is no guarding. Musculoskeletal:         General: No tenderness. Normal range of motion. Cervical back: Normal range of motion and neck supple. No tenderness. Right lower leg: No edema. Left lower leg: No edema. Comments: Pelvis stable; extremities atraumatic; moving all extremities spontaneously   Skin:     General: Skin is warm. Capillary Refill: Capillary refill takes less than 2 seconds. Findings: No erythema or lesion. Neurological:      General: No focal deficit present. Mental Status: She is alert and oriented to person, place, and time.          DIAGNOSTIC RESULTS     Labs Reviewed   COMPREHENSIVE METABOLIC PANEL W/ REFLEX TO MG FOR LOW K - Abnormal; Notable for the following components:       Result Value    BUN 37 (*)     Glucose 102 (*)     Total Protein 6.1 (*)     Total Bilirubin 3.3 (*)     ALT 57 (*)     AST 76 (*)     GFR Non- 46 (*)     GFR  56 (*)     All other components within normal limits   CBC WITH AUTO DIFFERENTIAL - Abnormal; Notable for the following components:    RBC 4.18 (*)     MCHC 31.0 (*)     Platelets 742 (*)     Segs Relative 75.4 (*)     Lymphocytes % 15.0 (*)     Monocytes % 8.5 (*)     All other components within normal limits   TROPONIN - Abnormal; Notable for the following components:    Troponin T 0.018 (*)     All other components within normal limits   LIPASE - Abnormal; Notable for the following components:    Lipase 10 (*)     All other components within normal limits   BRAIN NATRIURETIC PEPTIDE - Abnormal; Notable for the following components:    Pro-BNP 26,635 (*)     All other components within normal limits   LACTIC ACID, PLASMA - Abnormal; Notable for the following components:    Lactate 2.6 (*)     All other components within normal limits   URINE RT REFLEX TO CULTURE - Abnormal; Notable for the following components:    Clarity, UA SLIGHTLY CLOUDY (*)     Ketones, Urine 5 (*)     Protein, UA TRACE (*)     Cast Type OCCASIONAL (*)     Bacteria, UA FEW (*)     Crystal Type OCCASIONAL (*)     All other components within normal limits   POCT VENOUS - Abnormal; Notable for the following components:    pCO2, Paddy 58.1 (*)     HCO3, Venous 29.7 (*)     O2 Sat, Paddy 46.5 (*)     CO2 Content 31.5 (*)     All other components within normal limits   CULTURE, BLOOD 1   CULTURE, BLOOD 2          EKG: All EKG's are interpreted by the Emergency Department Physician who either signs or Co-signs this chart in the absence of a cardiologist.       EKG Interpretation    Interpreted by emergency department physician    EKG is interpreted by me. EKG shows what appears to be in a regular rhythm and 126 bpm, no remarkable ST segmentations or depressions, axis appears to be nondeviated, T waves overall appear to be unremarkable, no suspicious for atrial fibrillation, she does have a history of paroxysmal atrial fibrillation. QRS duration of 90, QTC of 4-69. Final impression, tachycardia, nonspecific EKG. Alva Flores MD     RADIOLOGY:     Non-plain film images such as CT, Ultrasound and MRI are read by the radiologist. Plain radiographic images are visualized and preliminarily interpreted by the emergency physician.        Interpretation per the Radiologist below, if available at the time of this note:    CT THORACIC RECONSTRUCTION WO POST PROCESS   Final Result   1. No acute posttraumatic abnormality in the chest, abdomen, or pelvis   identified. 2. Small bilateral pleural effusions and mild bilateral mosaic attenuation   pattern in the lungs compatible with reactive airways disease or   bronchiolitis. 3. Small volume of nonspecific low-density ascites. No convincing evidence   of solid organ injury. 4. Mild chronic appearing but age-indeterminate T1 superior endplate, mild T8   inferior endplate, moderate G31 superior endplate, and mild L2 superior   endplate compression fractures new from 2019. If clinically indicated   further evaluation could be obtained with MRI. CT LUMBAR RECONSTRUCTION WO POST PROCESS   Final Result   1. No acute posttraumatic abnormality in the chest, abdomen, or pelvis   identified. 2. Small bilateral pleural effusions and mild bilateral mosaic attenuation   pattern in the lungs compatible with reactive airways disease or   bronchiolitis. 3. Small volume of nonspecific low-density ascites. No convincing evidence   of solid organ injury. 4. Mild chronic appearing but age-indeterminate T1 superior endplate, mild T8   inferior endplate, moderate P96 superior endplate, and mild L2 superior   endplate compression fractures new from 2019. If clinically indicated   further evaluation could be obtained with MRI. CT ABDOMEN PELVIS WO CONTRAST Additional Contrast? None   Final Result   1. No acute posttraumatic abnormality in the chest, abdomen, or pelvis   identified. 2. Small bilateral pleural effusions and mild bilateral mosaic attenuation   pattern in the lungs compatible with reactive airways disease or   bronchiolitis. 3. Small volume of nonspecific low-density ascites. No convincing evidence   of solid organ injury.    4. Mild chronic appearing but age-indeterminate T1 superior endplate, mild T8   inferior endplate, moderate U57 superior endplate, and mild L2 superior endplate compression fractures new from 2019. If clinically indicated   further evaluation could be obtained with MRI. CT CHEST WO CONTRAST   Final Result   1. No acute posttraumatic abnormality in the chest, abdomen, or pelvis   identified. 2. Small bilateral pleural effusions and mild bilateral mosaic attenuation   pattern in the lungs compatible with reactive airways disease or   bronchiolitis. 3. Small volume of nonspecific low-density ascites. No convincing evidence   of solid organ injury. 4. Mild chronic appearing but age-indeterminate T1 superior endplate, mild T8   inferior endplate, moderate Z32 superior endplate, and mild L2 superior   endplate compression fractures new from 2019. If clinically indicated   further evaluation could be obtained with MRI. CT CERVICAL SPINE WO CONTRAST   Final Result   Superior endplate compression deformity T1 appears new compared to 2019. No   canal narrowing. No other fracture identified. Findings were discussed with John  at 1:43 pm on 8/22/2021. CT HEAD WO CONTRAST   Final Result   No acute intracranial abnormality.       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               ED BEDSIDE ULTRASOUND:   Performed by ED Physician Marisel Garces MD       LABS:  Labs Reviewed   COMPREHENSIVE METABOLIC PANEL W/ REFLEX TO MG FOR LOW K - Abnormal; Notable for the following components:       Result Value    BUN 37 (*)     Glucose 102 (*)     Total Protein 6.1 (*)     Total Bilirubin 3.3 (*)     ALT 57 (*)     AST 76 (*)     GFR Non- 46 (*)     GFR  56 (*)     All other components within normal limits   CBC WITH AUTO DIFFERENTIAL - Abnormal; Notable for the following components:    RBC 4.18 (*)     MCHC 31.0 (*)     Platelets 791 (*)     Segs Relative 75.4 (*)     Lymphocytes % 15.0 (*)     Monocytes % 8.5 (*)     All other components within normal limits   TROPONIN - Abnormal; Notable for the following components:    Troponin T 0.018 (*)     All other components within normal limits   LIPASE - Abnormal; Notable for the following components:    Lipase 10 (*)     All other components within normal limits   BRAIN NATRIURETIC PEPTIDE - Abnormal; Notable for the following components:    Pro-BNP 26,635 (*)     All other components within normal limits   LACTIC ACID, PLASMA - Abnormal; Notable for the following components:    Lactate 2.6 (*)     All other components within normal limits   URINE RT REFLEX TO CULTURE - Abnormal; Notable for the following components:    Clarity, UA SLIGHTLY CLOUDY (*)     Ketones, Urine 5 (*)     Protein, UA TRACE (*)     Cast Type OCCASIONAL (*)     Bacteria, UA FEW (*)     Crystal Type OCCASIONAL (*)     All other components within normal limits   POCT VENOUS - Abnormal; Notable for the following components:    pCO2, Paddy 58.1 (*)     HCO3, Venous 29.7 (*)     O2 Sat, Paddy 46.5 (*)     CO2 Content 31.5 (*)     All other components within normal limits   CULTURE, BLOOD 1   CULTURE, BLOOD 2       All other labs were within normal range or not returned as of this dictation. EMERGENCY DEPARTMENT COURSE and DIFFERENTIAL DIAGNOSIS/MDM:   Vitals:    Vitals:    08/22/21 1356 08/22/21 1447 08/22/21 1451 08/22/21 1512   BP:  (!) 119/90 129/76    Pulse: 101   92   Resp:       Temp:       TempSrc:       SpO2: 99% 98% 96%    Weight:               MDM  Number of Diagnoses or Management Options  Compression fracture of thoracic vertebra, initial encounter, unspecified thoracic vertebral level (HCC)  Elevated troponin  Tachycardia  Diagnosis management comments: 80year-old female presents from home with family. She had a mechanical fall several days ago. She was in her laundry room reaching for pants anger when she fell on her back. She was later found by family member.   She not have loss of conscious. She is not amnestic about events. She is on Xarelto. Since the fall family notes that she has been in bed a lot and complaining of some nausea. They report that she has not taken her medications in the several days and has not eaten very much. She presents to the emergency department for evaluation. No report of any remarkable infectious symptoms. She went to the emergency department with tachycardia. Active saturations are also mildly low on room air. Family does note that she typically has low oxygen saturations. She has been prescribed oxygen in the past but has declined using it. She does monitor her oxygen saturations at home. She is alert and orient to the emergency department answering questions appropriately. Family does report that she seems much \"brighter\" today than she has been over the last few days. Labs imaging obtained. Did obtain a CT head, CT C-spine, CT chest abdomen pelvis as well as CT of the lumbar and thoracic spines. CTs do show some chronic appearing thoracic compression fractures. These are new since 2019 did not appear to be acute or subacute. No other remarkable traumatic injury noted. Her labs show mildly elevated troponin 0.018. She does have some ketones in her urine. No markable leukocytosis. She was given fluids and Zofran in the emergency department. She is on propranolol at home and has not had that medication in several days. She was given a 5 mg dose of Lopressor. Her heart rate did improve with fluids and the Lopressor. At this time she continues to be alert and oriented with appropriate GCS. Family is at bedside. I did recommend admission for further hydration as well as trending of her troponins. I did discuss in depth that she did have abnormal labs in the emergency department including a mildly elevated troponin as well as signs of dehydration on her labs. Also discussed her high heart rate. Patient declines admission.   Family is comfortable with patient declining. They do demonstrate understanding of her symptoms and she also demonstrates understanding of her symptoms and shows appropriate capacity to decline. Patient does have Zofran at home to control any further nausea. She will follow-up promptly with her primary care physician as well as her cardiologist.  She is discharged home with family. Amount and/or Complexity of Data Reviewed  Clinical lab tests: reviewed  Decide to obtain previous medical records or to obtain history from someone other than the patient: yes        -  Patient seen and evaluated in the emergency department. -  Triage and nursing notes reviewed and incorporated. -  Old chart records reviewed and incorporated. -  Work-up included:  See above  -  Results discussed with patient. CONSULTS:  None    PROCEDURES:  None performed unless otherwise noted below     Procedures        FINAL IMPRESSION      1. Compression fracture of thoracic vertebra, initial encounter, unspecified thoracic vertebral level (HCC)    2. Tachycardia    3. Elevated troponin          DISPOSITION/PLAN   DISPOSITION Decision To Discharge 08/22/2021 03:15:19 PM      PATIENT REFERRED TO:  Johnny Ruiz MD  [address]  [phone]    Schedule an appointment as soon as possible for a visit in 1 day      Jet Jonas MD  [address] [place] [address]  [phone]    Schedule an appointment as soon as possible for a visit in 1 day        DISCHARGE MEDICATIONS:  New Prescriptions    No medications on file       ED Provider Disposition Time  DISPOSITION Decision To Discharge 08/22/2021 03:15:19 PM      Appropriate personal protective equipment was worn during the patient's evaluation. These included surgical, eye protection, surgical mask or in 95 respirator and gloves.   The patient was also placed in a surgical mask for the prevention of possible spread of respiratory viral illnesses. The Patient was instructed to read the package inserts with any medication that was prescribed. Major potential reactions and medication interactions were discussed. The Patient understands that there are numerous possible adverse reactions not covered. The patient was also instructed to arrange follow-up with his or her primary care provider for review of any pending labwork or incidental findings on any radiology results that were obtained. All efforts were made to discuss any incidental findings that require further monitoring. Controlled Substances Monitoring:     No flowsheet data found.     (Please note that portions of this note were completed with a voice recognition program.  Efforts were made to edit the dictations but occasionally words are mis-transcribed.)    Elen Sanchez MD (electronically signed)  Attending Emergency Physician           Elen Sanchez MD  08/22/21 [id number]

## 2022-01-01 ENCOUNTER — HOSPITAL ENCOUNTER (INPATIENT)
Age: 87
LOS: 3 days | DRG: 956 | End: 2022-04-06
Attending: GENERAL PRACTICE | Admitting: GENERAL PRACTICE
Payer: COMMERCIAL

## 2022-01-01 ENCOUNTER — APPOINTMENT (OUTPATIENT)
Dept: CT IMAGING | Age: 87
DRG: 956 | End: 2022-01-01
Payer: COMMERCIAL

## 2022-01-01 ENCOUNTER — APPOINTMENT (OUTPATIENT)
Dept: GENERAL RADIOLOGY | Age: 87
DRG: 956 | End: 2022-01-01
Payer: COMMERCIAL

## 2022-01-01 ENCOUNTER — ANESTHESIA (OUTPATIENT)
Dept: OPERATING ROOM | Age: 87
DRG: 956 | End: 2022-01-01
Payer: COMMERCIAL

## 2022-01-01 ENCOUNTER — ANESTHESIA EVENT (OUTPATIENT)
Dept: OPERATING ROOM | Age: 87
DRG: 956 | End: 2022-01-01
Payer: COMMERCIAL

## 2022-01-01 VITALS
DIASTOLIC BLOOD PRESSURE: 36 MMHG | RESPIRATION RATE: 10 BRPM | OXYGEN SATURATION: 89 % | TEMPERATURE: 96.8 F | SYSTOLIC BLOOD PRESSURE: 85 MMHG

## 2022-01-01 VITALS
SYSTOLIC BLOOD PRESSURE: 75 MMHG | HEART RATE: 86 BPM | BODY MASS INDEX: 19.69 KG/M2 | TEMPERATURE: 98.8 F | HEIGHT: 62 IN | DIASTOLIC BLOOD PRESSURE: 41 MMHG | RESPIRATION RATE: 40 BRPM | OXYGEN SATURATION: 100 % | WEIGHT: 107 LBS

## 2022-01-01 DIAGNOSIS — S72.001A CLOSED FRACTURE OF RIGHT HIP, INITIAL ENCOUNTER (HCC): Primary | ICD-10-CM

## 2022-01-01 DIAGNOSIS — I48.91 ATRIAL FIBRILLATION, UNSPECIFIED TYPE (HCC): ICD-10-CM

## 2022-01-01 LAB
ALBUMIN SERPL-MCNC: 3.4 GM/DL (ref 3.4–5)
ALBUMIN SERPL-MCNC: 3.8 GM/DL (ref 3.4–5)
ALP BLD-CCNC: 121 IU/L (ref 40–128)
ALP BLD-CCNC: 127 IU/L (ref 40–129)
ALT SERPL-CCNC: 11 U/L (ref 10–40)
ALT SERPL-CCNC: 19 U/L (ref 10–40)
ANION GAP SERPL CALCULATED.3IONS-SCNC: 11 MMOL/L (ref 4–16)
ANION GAP SERPL CALCULATED.3IONS-SCNC: 12 MMOL/L (ref 4–16)
ANION GAP SERPL CALCULATED.3IONS-SCNC: 9 MMOL/L (ref 4–16)
ANION GAP SERPL CALCULATED.3IONS-SCNC: 9 MMOL/L (ref 4–16)
APTT: 29.6 SECONDS (ref 25.1–37.1)
AST SERPL-CCNC: 32 IU/L (ref 15–37)
AST SERPL-CCNC: 50 IU/L (ref 15–37)
BACTERIA: NEGATIVE /HPF
BACTERIA: NEGATIVE /HPF
BASE EXCESS: 1 (ref 0–2.4)
BASOPHILS ABSOLUTE: 0 K/CU MM
BASOPHILS RELATIVE PERCENT: 0.1 % (ref 0–1)
BASOPHILS RELATIVE PERCENT: 0.2 % (ref 0–1)
BASOPHILS RELATIVE PERCENT: 0.3 % (ref 0–1)
BILIRUB SERPL-MCNC: 1.3 MG/DL (ref 0–1)
BILIRUB SERPL-MCNC: 1.7 MG/DL (ref 0–1)
BILIRUBIN URINE: ABNORMAL MG/DL
BILIRUBIN URINE: ABNORMAL MG/DL
BLOOD, URINE: ABNORMAL
BLOOD, URINE: NEGATIVE
BUN BLDV-MCNC: 20 MG/DL (ref 6–23)
BUN BLDV-MCNC: 30 MG/DL (ref 6–23)
BUN BLDV-MCNC: 32 MG/DL (ref 6–23)
BUN BLDV-MCNC: 35 MG/DL (ref 6–23)
CALCIUM SERPL-MCNC: 7.9 MG/DL (ref 8.3–10.6)
CALCIUM SERPL-MCNC: 8 MG/DL (ref 8.3–10.6)
CALCIUM SERPL-MCNC: 8.1 MG/DL (ref 8.3–10.6)
CALCIUM SERPL-MCNC: 8.4 MG/DL (ref 8.3–10.6)
CHLORIDE BLD-SCNC: 102 MMOL/L (ref 99–110)
CHLORIDE BLD-SCNC: 104 MMOL/L (ref 99–110)
CHLORIDE BLD-SCNC: 110 MMOL/L (ref 99–110)
CHLORIDE BLD-SCNC: 110 MMOL/L (ref 99–110)
CLARITY: CLEAR
CLARITY: CLEAR
CO2: 19 MMOL/L (ref 21–32)
CO2: 21 MMOL/L (ref 21–32)
CO2: 23 MMOL/L (ref 21–32)
CO2: 24 MMOL/L (ref 21–32)
COLOR: YELLOW
COLOR: YELLOW
CREAT SERPL-MCNC: 0.9 MG/DL (ref 0.6–1.1)
CREAT SERPL-MCNC: 1 MG/DL (ref 0.6–1.1)
CREAT SERPL-MCNC: 1.1 MG/DL (ref 0.6–1.1)
CREAT SERPL-MCNC: 1.5 MG/DL (ref 0.6–1.1)
CULTURE: NORMAL
DIFFERENTIAL TYPE: ABNORMAL
EKG ATRIAL RATE: 136 BPM
EKG DIAGNOSIS: NORMAL
EKG Q-T INTERVAL: 354 MS
EKG QRS DURATION: 96 MS
EKG QTC CALCULATION (BAZETT): 512 MS
EKG R AXIS: -7 DEGREES
EKG T AXIS: -31 DEGREES
EKG VENTRICULAR RATE: 126 BPM
EOSINOPHILS ABSOLUTE: 0 K/CU MM
EOSINOPHILS RELATIVE PERCENT: 0 % (ref 0–3)
GFR AFRICAN AMERICAN: 39 ML/MIN/1.73M2
GFR AFRICAN AMERICAN: 56 ML/MIN/1.73M2
GFR AFRICAN AMERICAN: >60 ML/MIN/1.73M2
GFR AFRICAN AMERICAN: >60 ML/MIN/1.73M2
GFR NON-AFRICAN AMERICAN: 32 ML/MIN/1.73M2
GFR NON-AFRICAN AMERICAN: 46 ML/MIN/1.73M2
GFR NON-AFRICAN AMERICAN: 51 ML/MIN/1.73M2
GFR NON-AFRICAN AMERICAN: 58 ML/MIN/1.73M2
GLUCOSE BLD-MCNC: 119 MG/DL (ref 70–99)
GLUCOSE BLD-MCNC: 148 MG/DL (ref 70–99)
GLUCOSE BLD-MCNC: 91 MG/DL (ref 70–99)
GLUCOSE BLD-MCNC: 99 MG/DL (ref 70–99)
GLUCOSE, URINE: NEGATIVE MG/DL
GLUCOSE, URINE: NEGATIVE MG/DL
HCO3 VENOUS: 26.1 MMOL/L (ref 19–25)
HCT VFR BLD CALC: 27.1 % (ref 37–47)
HCT VFR BLD CALC: 28 % (ref 37–47)
HCT VFR BLD CALC: 29.2 % (ref 37–47)
HCT VFR BLD CALC: 31.7 % (ref 37–47)
HEMOGLOBIN: 10.1 GM/DL (ref 12.5–16)
HEMOGLOBIN: 8.1 GM/DL (ref 12.5–16)
HEMOGLOBIN: 8.2 GM/DL (ref 12.5–16)
HEMOGLOBIN: 8.9 GM/DL (ref 12.5–16)
IMMATURE NEUTROPHIL %: 0.3 % (ref 0–0.43)
IMMATURE NEUTROPHIL %: 0.5 % (ref 0–0.43)
IMMATURE NEUTROPHIL %: 0.6 % (ref 0–0.43)
INR BLD: 1.13 INDEX
KETONES, URINE: ABNORMAL MG/DL
KETONES, URINE: ABNORMAL MG/DL
LACTATE: 2.4 MMOL/L (ref 0.4–2)
LACTIC ACID, SEPSIS: 2.5 MMOL/L (ref 0.5–1.9)
LACTIC ACID, SEPSIS: 3.2 MMOL/L (ref 0.5–1.9)
LEUKOCYTE ESTERASE, URINE: NEGATIVE
LEUKOCYTE ESTERASE, URINE: NEGATIVE
LV EF: 60 %
LVEF MODALITY: NORMAL
LYMPHOCYTES ABSOLUTE: 0.8 K/CU MM
LYMPHOCYTES ABSOLUTE: 1.1 K/CU MM
LYMPHOCYTES ABSOLUTE: 1.1 K/CU MM
LYMPHOCYTES RELATIVE PERCENT: 13.8 % (ref 24–44)
LYMPHOCYTES RELATIVE PERCENT: 4.4 % (ref 24–44)
LYMPHOCYTES RELATIVE PERCENT: 5.2 % (ref 24–44)
Lab: NORMAL
MCH RBC QN AUTO: 29.4 PG (ref 27–31)
MCH RBC QN AUTO: 29.8 PG (ref 27–31)
MCH RBC QN AUTO: 30 PG (ref 27–31)
MCH RBC QN AUTO: 30.1 PG (ref 27–31)
MCHC RBC AUTO-ENTMCNC: 29.3 % (ref 32–36)
MCHC RBC AUTO-ENTMCNC: 29.9 % (ref 32–36)
MCHC RBC AUTO-ENTMCNC: 30.5 % (ref 32–36)
MCHC RBC AUTO-ENTMCNC: 31.9 % (ref 32–36)
MCV RBC AUTO: 100.4 FL (ref 78–100)
MCV RBC AUTO: 100.4 FL (ref 78–100)
MCV RBC AUTO: 94.3 FL (ref 78–100)
MCV RBC AUTO: 97.7 FL (ref 78–100)
MONOCYTES ABSOLUTE: 0.8 K/CU MM
MONOCYTES ABSOLUTE: 1 K/CU MM
MONOCYTES ABSOLUTE: 1.7 K/CU MM
MONOCYTES RELATIVE PERCENT: 10.4 % (ref 0–4)
MONOCYTES RELATIVE PERCENT: 6.6 % (ref 0–4)
MONOCYTES RELATIVE PERCENT: 6.7 % (ref 0–4)
MUCUS: ABNORMAL HPF
MUCUS: ABNORMAL HPF
NITRITE URINE, QUANTITATIVE: NEGATIVE
NITRITE URINE, QUANTITATIVE: NEGATIVE
NUCLEATED RBC %: 0 %
O2 SAT, VEN: 72.1 % (ref 50–70)
PCO2, VEN: 53 MMHG (ref 38–52)
PDW BLD-RTO: 14.5 % (ref 11.7–14.9)
PDW BLD-RTO: 14.6 % (ref 11.7–14.9)
PDW BLD-RTO: 14.7 % (ref 11.7–14.9)
PDW BLD-RTO: 14.7 % (ref 11.7–14.9)
PH VENOUS: 7.3 (ref 7.32–7.42)
PH, URINE: 5.5 (ref 5–8)
PH, URINE: 6 (ref 5–8)
PLATELET # BLD: 105 K/CU MM (ref 140–440)
PLATELET # BLD: 107 K/CU MM (ref 140–440)
PLATELET # BLD: 112 K/CU MM (ref 140–440)
PLATELET # BLD: 113 K/CU MM (ref 140–440)
PMV BLD AUTO: 10.6 FL (ref 7.5–11.1)
PMV BLD AUTO: 10.7 FL (ref 7.5–11.1)
PMV BLD AUTO: 10.8 FL (ref 7.5–11.1)
PMV BLD AUTO: 10.9 FL (ref 7.5–11.1)
PO2, VEN: 46 MMHG (ref 28–48)
POTASSIUM SERPL-SCNC: 4.8 MMOL/L (ref 3.5–5.1)
POTASSIUM SERPL-SCNC: 5 MMOL/L (ref 3.5–5.1)
POTASSIUM SERPL-SCNC: 5.6 MMOL/L (ref 3.5–5.1)
POTASSIUM SERPL-SCNC: 5.7 MMOL/L (ref 3.5–5.1)
PROCALCITONIN: 0.38
PROTEIN UA: ABNORMAL MG/DL
PROTEIN UA: ABNORMAL MG/DL
PROTHROMBIN TIME: 14.6 SECONDS (ref 11.7–14.5)
RBC # BLD: 2.7 M/CU MM (ref 4.2–5.4)
RBC # BLD: 2.79 M/CU MM (ref 4.2–5.4)
RBC # BLD: 2.99 M/CU MM (ref 4.2–5.4)
RBC # BLD: 3.36 M/CU MM (ref 4.2–5.4)
RBC URINE: 1 /HPF (ref 0–6)
RBC URINE: 85 /HPF (ref 0–6)
SEGMENTED NEUTROPHILS ABSOLUTE COUNT: 12.8 K/CU MM
SEGMENTED NEUTROPHILS ABSOLUTE COUNT: 21.7 K/CU MM
SEGMENTED NEUTROPHILS ABSOLUTE COUNT: 5.7 K/CU MM
SEGMENTED NEUTROPHILS RELATIVE PERCENT: 75.2 % (ref 36–66)
SEGMENTED NEUTROPHILS RELATIVE PERCENT: 87.6 % (ref 36–66)
SEGMENTED NEUTROPHILS RELATIVE PERCENT: 88.1 % (ref 36–66)
SODIUM BLD-SCNC: 135 MMOL/L (ref 135–145)
SODIUM BLD-SCNC: 138 MMOL/L (ref 135–145)
SODIUM BLD-SCNC: 140 MMOL/L (ref 135–145)
SODIUM BLD-SCNC: 141 MMOL/L (ref 135–145)
SPECIFIC GRAVITY UA: 1.02 (ref 1–1.03)
SPECIFIC GRAVITY UA: >1.03 (ref 1–1.03)
SPECIMEN: NORMAL
T4 FREE: 0.92 NG/DL (ref 0.9–1.8)
TOTAL CK: 398 IU/L (ref 26–140)
TOTAL IMMATURE NEUTOROPHIL: 0.02 K/CU MM
TOTAL IMMATURE NEUTOROPHIL: 0.08 K/CU MM
TOTAL IMMATURE NEUTOROPHIL: 0.15 K/CU MM
TOTAL NUCLEATED RBC: 0 K/CU MM
TOTAL PROTEIN: 5.1 GM/DL (ref 6.4–8.2)
TOTAL PROTEIN: 5.8 GM/DL (ref 6.4–8.2)
TROPONIN T: <0.01 NG/ML
TSH HIGH SENSITIVITY: 5.17 UIU/ML (ref 0.27–4.2)
UROBILINOGEN, URINE: 1 MG/DL (ref 0.2–1)
UROBILINOGEN, URINE: 4 MG/DL (ref 0.2–1)
WBC # BLD: 14.7 K/CU MM (ref 4–10.5)
WBC # BLD: 24.6 K/CU MM (ref 4–10.5)
WBC # BLD: 24.6 K/CU MM (ref 4–10.5)
WBC # BLD: 7.6 K/CU MM (ref 4–10.5)
WBC UA: <1 /HPF (ref 0–5)
WBC UA: ABNORMAL /HPF (ref 0–5)

## 2022-01-01 PROCEDURE — 05HB33Z INSERTION OF INFUSION DEVICE INTO RIGHT BASILIC VEIN, PERCUTANEOUS APPROACH: ICD-10-PCS | Performed by: INTERNAL MEDICINE

## 2022-01-01 PROCEDURE — 85027 COMPLETE CBC AUTOMATED: CPT

## 2022-01-01 PROCEDURE — 6370000000 HC RX 637 (ALT 250 FOR IP): Performed by: INTERNAL MEDICINE

## 2022-01-01 PROCEDURE — 2500000003 HC RX 250 WO HCPCS: Performed by: GENERAL PRACTICE

## 2022-01-01 PROCEDURE — 6360000002 HC RX W HCPCS: Performed by: INTERNAL MEDICINE

## 2022-01-01 PROCEDURE — 85025 COMPLETE CBC W/AUTO DIFF WBC: CPT

## 2022-01-01 PROCEDURE — 2709999900 HC NON-CHARGEABLE SUPPLY

## 2022-01-01 PROCEDURE — 2580000003 HC RX 258: Performed by: ORTHOPAEDIC SURGERY

## 2022-01-01 PROCEDURE — 6360000002 HC RX W HCPCS: Performed by: ORTHOPAEDIC SURGERY

## 2022-01-01 PROCEDURE — 36410 VNPNXR 3YR/> PHY/QHP DX/THER: CPT

## 2022-01-01 PROCEDURE — 2500000003 HC RX 250 WO HCPCS

## 2022-01-01 PROCEDURE — 6360000002 HC RX W HCPCS: Performed by: GENERAL PRACTICE

## 2022-01-01 PROCEDURE — 82550 ASSAY OF CK (CPK): CPT

## 2022-01-01 PROCEDURE — 83605 ASSAY OF LACTIC ACID: CPT

## 2022-01-01 PROCEDURE — 6370000000 HC RX 637 (ALT 250 FOR IP): Performed by: ORTHOPAEDIC SURGERY

## 2022-01-01 PROCEDURE — 2709999900 HC NON-CHARGEABLE SUPPLY: Performed by: ORTHOPAEDIC SURGERY

## 2022-01-01 PROCEDURE — 1200000000 HC SEMI PRIVATE

## 2022-01-01 PROCEDURE — 84439 ASSAY OF FREE THYROXINE: CPT

## 2022-01-01 PROCEDURE — 72125 CT NECK SPINE W/O DYE: CPT

## 2022-01-01 PROCEDURE — 2780000010 HC IMPLANT OTHER: Performed by: ORTHOPAEDIC SURGERY

## 2022-01-01 PROCEDURE — 94761 N-INVAS EAR/PLS OXIMETRY MLT: CPT

## 2022-01-01 PROCEDURE — 2500000003 HC RX 250 WO HCPCS: Performed by: INTERNAL MEDICINE

## 2022-01-01 PROCEDURE — C1769 GUIDE WIRE: HCPCS | Performed by: ORTHOPAEDIC SURGERY

## 2022-01-01 PROCEDURE — 2700000000 HC OXYGEN THERAPY PER DAY

## 2022-01-01 PROCEDURE — 7100000001 HC PACU RECOVERY - ADDTL 15 MIN: Performed by: ORTHOPAEDIC SURGERY

## 2022-01-01 PROCEDURE — 3600000004 HC SURGERY LEVEL 4 BASE: Performed by: ORTHOPAEDIC SURGERY

## 2022-01-01 PROCEDURE — 3700000000 HC ANESTHESIA ATTENDED CARE: Performed by: ORTHOPAEDIC SURGERY

## 2022-01-01 PROCEDURE — 71045 X-RAY EXAM CHEST 1 VIEW: CPT

## 2022-01-01 PROCEDURE — 0QS736Z REPOSITION LEFT UPPER FEMUR WITH INTRAMEDULLARY INTERNAL FIXATION DEVICE, PERCUTANEOUS APPROACH: ICD-10-PCS | Performed by: ORTHOPAEDIC SURGERY

## 2022-01-01 PROCEDURE — 2580000003 HC RX 258: Performed by: INTERNAL MEDICINE

## 2022-01-01 PROCEDURE — 80048 BASIC METABOLIC PNL TOTAL CA: CPT

## 2022-01-01 PROCEDURE — 51702 INSERT TEMP BLADDER CATH: CPT

## 2022-01-01 PROCEDURE — 2580000003 HC RX 258: Performed by: GENERAL PRACTICE

## 2022-01-01 PROCEDURE — 6360000002 HC RX W HCPCS: Performed by: PHYSICIAN ASSISTANT

## 2022-01-01 PROCEDURE — 85730 THROMBOPLASTIN TIME PARTIAL: CPT

## 2022-01-01 PROCEDURE — 2720000010 HC SURG SUPPLY STERILE: Performed by: ORTHOPAEDIC SURGERY

## 2022-01-01 PROCEDURE — 76000 FLUOROSCOPY <1 HR PHYS/QHP: CPT

## 2022-01-01 PROCEDURE — 85610 PROTHROMBIN TIME: CPT

## 2022-01-01 PROCEDURE — 84443 ASSAY THYROID STIM HORMONE: CPT

## 2022-01-01 PROCEDURE — 73590 X-RAY EXAM OF LOWER LEG: CPT

## 2022-01-01 PROCEDURE — 6360000002 HC RX W HCPCS

## 2022-01-01 PROCEDURE — 7100000000 HC PACU RECOVERY - FIRST 15 MIN: Performed by: ORTHOPAEDIC SURGERY

## 2022-01-01 PROCEDURE — 3700000001 HC ADD 15 MINUTES (ANESTHESIA): Performed by: ORTHOPAEDIC SURGERY

## 2022-01-01 PROCEDURE — 93010 ELECTROCARDIOGRAM REPORT: CPT | Performed by: INTERNAL MEDICINE

## 2022-01-01 PROCEDURE — 36415 COLL VENOUS BLD VENIPUNCTURE: CPT

## 2022-01-01 PROCEDURE — 93005 ELECTROCARDIOGRAM TRACING: CPT | Performed by: PHYSICIAN ASSISTANT

## 2022-01-01 PROCEDURE — 84484 ASSAY OF TROPONIN QUANT: CPT

## 2022-01-01 PROCEDURE — 3600000014 HC SURGERY LEVEL 4 ADDTL 15MIN: Performed by: ORTHOPAEDIC SURGERY

## 2022-01-01 PROCEDURE — 51798 US URINE CAPACITY MEASURE: CPT

## 2022-01-01 PROCEDURE — 84145 PROCALCITONIN (PCT): CPT

## 2022-01-01 PROCEDURE — 80053 COMPREHEN METABOLIC PANEL: CPT

## 2022-01-01 PROCEDURE — 74177 CT ABD & PELVIS W/CONTRAST: CPT

## 2022-01-01 PROCEDURE — 76937 US GUIDE VASCULAR ACCESS: CPT

## 2022-01-01 PROCEDURE — 87086 URINE CULTURE/COLONY COUNT: CPT

## 2022-01-01 PROCEDURE — 6360000004 HC RX CONTRAST MEDICATION: Performed by: ORTHOPAEDIC SURGERY

## 2022-01-01 PROCEDURE — 87040 BLOOD CULTURE FOR BACTERIA: CPT

## 2022-01-01 PROCEDURE — 93306 TTE W/DOPPLER COMPLETE: CPT

## 2022-01-01 PROCEDURE — 81001 URINALYSIS AUTO W/SCOPE: CPT

## 2022-01-01 PROCEDURE — C1713 ANCHOR/SCREW BN/BN,TIS/BN: HCPCS | Performed by: ORTHOPAEDIC SURGERY

## 2022-01-01 PROCEDURE — 2500000003 HC RX 250 WO HCPCS: Performed by: ORTHOPAEDIC SURGERY

## 2022-01-01 PROCEDURE — 99284 EMERGENCY DEPT VISIT MOD MDM: CPT

## 2022-01-01 PROCEDURE — 70450 CT HEAD/BRAIN W/O DYE: CPT

## 2022-01-01 PROCEDURE — 2140000000 HC CCU INTERMEDIATE R&B

## 2022-01-01 PROCEDURE — 73502 X-RAY EXAM HIP UNI 2-3 VIEWS: CPT

## 2022-01-01 PROCEDURE — 2580000003 HC RX 258

## 2022-01-01 PROCEDURE — 2500000003 HC RX 250 WO HCPCS: Performed by: PHYSICIAN ASSISTANT

## 2022-01-01 PROCEDURE — 82805 BLOOD GASES W/O2 SATURATION: CPT

## 2022-01-01 DEVICE — NAIL IM L360MM DIA11MM 130DEG LNG R PROX FEM GRN TI CANN: Type: IMPLANTABLE DEVICE | Site: HIP | Status: FUNCTIONAL

## 2022-01-01 DEVICE — SCREW BNE L44MM DIA5MM COR DIA4.3MM TIB LT GRN TI ALLY ST: Type: IMPLANTABLE DEVICE | Site: HIP | Status: FUNCTIONAL

## 2022-01-01 DEVICE — IMPLANTABLE DEVICE: Type: IMPLANTABLE DEVICE | Site: HIP | Status: FUNCTIONAL

## 2022-01-01 RX ORDER — FENTANYL CITRATE 50 UG/ML
25 INJECTION, SOLUTION INTRAMUSCULAR; INTRAVENOUS EVERY 30 MIN PRN
Status: DISCONTINUED | OUTPATIENT
Start: 2022-01-01 | End: 2022-01-01 | Stop reason: HOSPADM

## 2022-01-01 RX ORDER — LIDOCAINE HYDROCHLORIDE 10 MG/ML
5 INJECTION, SOLUTION EPIDURAL; INFILTRATION; INTRACAUDAL; PERINEURAL ONCE
Status: DISCONTINUED | OUTPATIENT
Start: 2022-01-01 | End: 2022-01-01 | Stop reason: HOSPADM

## 2022-01-01 RX ORDER — VENLAFAXINE 37.5 MG/1
37.5 TABLET ORAL 3 TIMES DAILY
Status: DISCONTINUED | OUTPATIENT
Start: 2022-01-01 | End: 2022-01-01 | Stop reason: HOSPADM

## 2022-01-01 RX ORDER — POLYETHYLENE GLYCOL 3350 17 G/17G
17 POWDER, FOR SOLUTION ORAL DAILY PRN
Status: DISCONTINUED | OUTPATIENT
Start: 2022-01-01 | End: 2022-01-01 | Stop reason: HOSPADM

## 2022-01-01 RX ORDER — LIDOCAINE HYDROCHLORIDE 20 MG/ML
INJECTION, SOLUTION INTRAVENOUS PRN
Status: DISCONTINUED | OUTPATIENT
Start: 2022-01-01 | End: 2022-01-01 | Stop reason: SDUPTHER

## 2022-01-01 RX ORDER — SODIUM CHLORIDE 9 MG/ML
INJECTION, SOLUTION INTRAVENOUS PRN
Status: DISCONTINUED | OUTPATIENT
Start: 2022-01-01 | End: 2022-01-01 | Stop reason: HOSPADM

## 2022-01-01 RX ORDER — SODIUM CHLORIDE 9 MG/ML
INJECTION, SOLUTION INTRAVENOUS PRN
Status: DISCONTINUED | OUTPATIENT
Start: 2022-01-01 | End: 2022-01-01 | Stop reason: SDUPTHER

## 2022-01-01 RX ORDER — PANTOPRAZOLE SODIUM 40 MG/1
40 TABLET, DELAYED RELEASE ORAL
Status: DISCONTINUED | OUTPATIENT
Start: 2022-01-01 | End: 2022-01-01 | Stop reason: HOSPADM

## 2022-01-01 RX ORDER — CEFAZOLIN SODIUM 2 G/100ML
2000 INJECTION, SOLUTION INTRAVENOUS
Status: DISCONTINUED | OUTPATIENT
Start: 2022-01-01 | End: 2022-01-01 | Stop reason: HOSPADM

## 2022-01-01 RX ORDER — LORAZEPAM 2 MG/ML
0.5 INJECTION INTRAMUSCULAR EVERY 6 HOURS PRN
Status: DISCONTINUED | OUTPATIENT
Start: 2022-01-01 | End: 2022-01-01

## 2022-01-01 RX ORDER — PROPRANOLOL HYDROCHLORIDE 80 MG/1
80 CAPSULE, EXTENDED RELEASE ORAL DAILY
COMMUNITY

## 2022-01-01 RX ORDER — LORAZEPAM 2 MG/ML
0.5 INJECTION INTRAMUSCULAR EVERY 4 HOURS PRN
Status: DISCONTINUED | OUTPATIENT
Start: 2022-01-01 | End: 2022-01-01 | Stop reason: HOSPADM

## 2022-01-01 RX ORDER — FUROSEMIDE 10 MG/ML
20 INJECTION INTRAMUSCULAR; INTRAVENOUS ONCE
Status: COMPLETED | OUTPATIENT
Start: 2022-01-01 | End: 2022-01-01

## 2022-01-01 RX ORDER — SODIUM CHLORIDE 9 MG/ML
500 INJECTION, SOLUTION INTRAVENOUS ONCE
Status: COMPLETED | OUTPATIENT
Start: 2022-01-01 | End: 2022-01-01

## 2022-01-01 RX ORDER — ONDANSETRON 4 MG/1
4 TABLET, ORALLY DISINTEGRATING ORAL EVERY 8 HOURS PRN
Status: DISCONTINUED | OUTPATIENT
Start: 2022-01-01 | End: 2022-01-01 | Stop reason: HOSPADM

## 2022-01-01 RX ORDER — ACETAMINOPHEN 325 MG/1
650 TABLET ORAL EVERY 6 HOURS PRN
Status: DISCONTINUED | OUTPATIENT
Start: 2022-01-01 | End: 2022-01-01 | Stop reason: HOSPADM

## 2022-01-01 RX ORDER — DIGOXIN 0.25 MG/ML
125 INJECTION INTRAMUSCULAR; INTRAVENOUS ONCE
Status: COMPLETED | OUTPATIENT
Start: 2022-01-01 | End: 2022-01-01

## 2022-01-01 RX ORDER — SODIUM CHLORIDE 9 MG/ML
INJECTION, SOLUTION INTRAVENOUS CONTINUOUS
Status: DISCONTINUED | OUTPATIENT
Start: 2022-01-01 | End: 2022-01-01

## 2022-01-01 RX ORDER — ACETAMINOPHEN 650 MG/1
650 SUPPOSITORY RECTAL EVERY 6 HOURS PRN
Status: DISCONTINUED | OUTPATIENT
Start: 2022-01-01 | End: 2022-01-01 | Stop reason: HOSPADM

## 2022-01-01 RX ORDER — IPRATROPIUM BROMIDE 21 UG/1
1 SPRAY, METERED NASAL 3 TIMES DAILY PRN
COMMUNITY

## 2022-01-01 RX ORDER — SODIUM CHLORIDE, SODIUM LACTATE, POTASSIUM CHLORIDE, CALCIUM CHLORIDE 600; 310; 30; 20 MG/100ML; MG/100ML; MG/100ML; MG/100ML
INJECTION, SOLUTION INTRAVENOUS CONTINUOUS PRN
Status: DISCONTINUED | OUTPATIENT
Start: 2022-01-01 | End: 2022-01-01 | Stop reason: SDUPTHER

## 2022-01-01 RX ORDER — ROCURONIUM BROMIDE 10 MG/ML
INJECTION, SOLUTION INTRAVENOUS PRN
Status: DISCONTINUED | OUTPATIENT
Start: 2022-01-01 | End: 2022-01-01 | Stop reason: SDUPTHER

## 2022-01-01 RX ORDER — SODIUM CHLORIDE 0.9 % (FLUSH) 0.9 %
5-40 SYRINGE (ML) INJECTION PRN
Status: DISCONTINUED | OUTPATIENT
Start: 2022-01-01 | End: 2022-01-01 | Stop reason: HOSPADM

## 2022-01-01 RX ORDER — ESCITALOPRAM OXALATE 10 MG/1
20 TABLET ORAL DAILY
Status: DISCONTINUED | OUTPATIENT
Start: 2022-01-01 | End: 2022-01-01 | Stop reason: HOSPADM

## 2022-01-01 RX ORDER — SODIUM PHOSPHATE, DIBASIC AND SODIUM PHOSPHATE, MONOBASIC 7; 19 G/133ML; G/133ML
1 ENEMA RECTAL
Status: DISPENSED | OUTPATIENT
Start: 2022-01-01 | End: 2022-01-01

## 2022-01-01 RX ORDER — SODIUM CHLORIDE 0.9 % (FLUSH) 0.9 %
5-40 SYRINGE (ML) INJECTION EVERY 12 HOURS SCHEDULED
Status: DISCONTINUED | OUTPATIENT
Start: 2022-01-01 | End: 2022-01-01 | Stop reason: SDUPTHER

## 2022-01-01 RX ORDER — ONDANSETRON 2 MG/ML
INJECTION INTRAMUSCULAR; INTRAVENOUS PRN
Status: DISCONTINUED | OUTPATIENT
Start: 2022-01-01 | End: 2022-01-01 | Stop reason: SDUPTHER

## 2022-01-01 RX ORDER — HYDROCODONE BITARTRATE AND ACETAMINOPHEN 5; 325 MG/1; MG/1
1 TABLET ORAL EVERY 6 HOURS PRN
Status: DISCONTINUED | OUTPATIENT
Start: 2022-01-01 | End: 2022-01-01 | Stop reason: HOSPADM

## 2022-01-01 RX ORDER — PROPOFOL 10 MG/ML
INJECTION, EMULSION INTRAVENOUS PRN
Status: DISCONTINUED | OUTPATIENT
Start: 2022-01-01 | End: 2022-01-01 | Stop reason: SDUPTHER

## 2022-01-01 RX ORDER — QUETIAPINE FUMARATE 25 MG/1
25 TABLET, FILM COATED ORAL 2 TIMES DAILY
Status: DISCONTINUED | OUTPATIENT
Start: 2022-01-01 | End: 2022-01-01 | Stop reason: HOSPADM

## 2022-01-01 RX ORDER — ONDANSETRON 2 MG/ML
4 INJECTION INTRAMUSCULAR; INTRAVENOUS EVERY 6 HOURS PRN
Status: DISCONTINUED | OUTPATIENT
Start: 2022-01-01 | End: 2022-01-01 | Stop reason: HOSPADM

## 2022-01-01 RX ORDER — MECLIZINE HCL 12.5 MG/1
12.5 TABLET ORAL EVERY 6 HOURS PRN
COMMUNITY

## 2022-01-01 RX ORDER — SODIUM CHLORIDE 0.9 % (FLUSH) 0.9 %
5-40 SYRINGE (ML) INJECTION EVERY 12 HOURS SCHEDULED
Status: DISCONTINUED | OUTPATIENT
Start: 2022-01-01 | End: 2022-01-01 | Stop reason: HOSPADM

## 2022-01-01 RX ORDER — DOCUSATE SODIUM 100 MG/1
100 CAPSULE, LIQUID FILLED ORAL 2 TIMES DAILY PRN
Status: DISCONTINUED | OUTPATIENT
Start: 2022-01-01 | End: 2022-01-01 | Stop reason: HOSPADM

## 2022-01-01 RX ORDER — FENTANYL CITRATE 0.05 MG/ML
50 INJECTION, SOLUTION INTRAMUSCULAR; INTRAVENOUS
Status: DISCONTINUED | OUTPATIENT
Start: 2022-01-01 | End: 2022-01-01 | Stop reason: HOSPADM

## 2022-01-01 RX ORDER — SODIUM CHLORIDE 0.9 % (FLUSH) 0.9 %
5-40 SYRINGE (ML) INJECTION PRN
Status: DISCONTINUED | OUTPATIENT
Start: 2022-01-01 | End: 2022-01-01 | Stop reason: SDUPTHER

## 2022-01-01 RX ADMIN — ONDANSETRON 4 MG: 2 INJECTION INTRAMUSCULAR; INTRAVENOUS at 21:17

## 2022-01-01 RX ADMIN — CEFAZOLIN SODIUM 1000 MG: 1 INJECTION, POWDER, FOR SOLUTION INTRAMUSCULAR; INTRAVENOUS at 06:58

## 2022-01-01 RX ADMIN — LORAZEPAM 0.5 MG: 2 INJECTION INTRAMUSCULAR; INTRAVENOUS at 03:35

## 2022-01-01 RX ADMIN — QUETIAPINE FUMARATE 25 MG: 25 TABLET ORAL at 20:04

## 2022-01-01 RX ADMIN — PANTOPRAZOLE SODIUM 40 MG: 40 TABLET, DELAYED RELEASE ORAL at 05:42

## 2022-01-01 RX ADMIN — ONDANSETRON 4 MG: 2 INJECTION INTRAMUSCULAR; INTRAVENOUS at 16:38

## 2022-01-01 RX ADMIN — SODIUM CHLORIDE 500 ML: 9 INJECTION, SOLUTION INTRAVENOUS at 13:39

## 2022-01-01 RX ADMIN — HYDROMORPHONE HYDROCHLORIDE 0.5 MG: 1 INJECTION, SOLUTION INTRAMUSCULAR; INTRAVENOUS; SUBCUTANEOUS at 04:42

## 2022-01-01 RX ADMIN — LORAZEPAM 0.5 MG: 2 INJECTION INTRAMUSCULAR; INTRAVENOUS at 08:46

## 2022-01-01 RX ADMIN — SODIUM CHLORIDE: 9 INJECTION, SOLUTION INTRAVENOUS at 23:05

## 2022-01-01 RX ADMIN — LORAZEPAM 0.5 MG: 2 INJECTION INTRAMUSCULAR; INTRAVENOUS at 20:45

## 2022-01-01 RX ADMIN — CEFAZOLIN SODIUM 1000 MG: 1 INJECTION, POWDER, FOR SOLUTION INTRAMUSCULAR; INTRAVENOUS at 17:41

## 2022-01-01 RX ADMIN — SODIUM CHLORIDE, PRESERVATIVE FREE 10 ML: 5 INJECTION INTRAVENOUS at 04:42

## 2022-01-01 RX ADMIN — HYDROMORPHONE HYDROCHLORIDE 0.5 MG: 1 INJECTION, SOLUTION INTRAMUSCULAR; INTRAVENOUS; SUBCUTANEOUS at 03:52

## 2022-01-01 RX ADMIN — SODIUM CHLORIDE, PRESERVATIVE FREE 10 ML: 5 INJECTION INTRAVENOUS at 23:31

## 2022-01-01 RX ADMIN — DIGOXIN 125 MCG: 0.25 INJECTION INTRAMUSCULAR; INTRAVENOUS at 18:16

## 2022-01-01 RX ADMIN — AMIODARONE HYDROCHLORIDE 0.5 MG/MIN: 50 INJECTION, SOLUTION INTRAVENOUS at 01:32

## 2022-01-01 RX ADMIN — AMIODARONE HYDROCHLORIDE 1 MG/MIN: 50 INJECTION, SOLUTION INTRAVENOUS at 19:56

## 2022-01-01 RX ADMIN — ONDANSETRON 4 MG: 2 INJECTION INTRAMUSCULAR; INTRAVENOUS at 04:38

## 2022-01-01 RX ADMIN — HYDROMORPHONE HYDROCHLORIDE 0.5 MG: 1 INJECTION, SOLUTION INTRAMUSCULAR; INTRAVENOUS; SUBCUTANEOUS at 21:28

## 2022-01-01 RX ADMIN — SODIUM CHLORIDE: 9 INJECTION, SOLUTION INTRAVENOUS at 17:52

## 2022-01-01 RX ADMIN — PROPOFOL 50 MG: 10 INJECTION, EMULSION INTRAVENOUS at 15:29

## 2022-01-01 RX ADMIN — SODIUM CHLORIDE, PRESERVATIVE FREE 10 ML: 5 INJECTION INTRAVENOUS at 21:17

## 2022-01-01 RX ADMIN — METOPROLOL TARTRATE 5 MG: 1 INJECTION, SOLUTION INTRAVENOUS at 11:27

## 2022-01-01 RX ADMIN — LIDOCAINE HYDROCHLORIDE 50 MG: 20 INJECTION, SOLUTION INTRAVENOUS at 15:29

## 2022-01-01 RX ADMIN — SODIUM CHLORIDE: 9 INJECTION, SOLUTION INTRAVENOUS at 07:22

## 2022-01-01 RX ADMIN — DEXTROSE MONOHYDRATE 15 MG/HR: 50 INJECTION, SOLUTION INTRAVENOUS at 18:27

## 2022-01-01 RX ADMIN — DIGOXIN 125 MCG: 0.25 INJECTION INTRAMUSCULAR; INTRAVENOUS at 08:40

## 2022-01-01 RX ADMIN — HYDROMORPHONE HYDROCHLORIDE 0.25 MG: 1 INJECTION, SOLUTION INTRAMUSCULAR; INTRAVENOUS; SUBCUTANEOUS at 16:09

## 2022-01-01 RX ADMIN — AMIODARONE HYDROCHLORIDE 150 MG: 50 INJECTION, SOLUTION INTRAVENOUS at 19:38

## 2022-01-01 RX ADMIN — SODIUM CHLORIDE, POTASSIUM CHLORIDE, SODIUM LACTATE AND CALCIUM CHLORIDE: 600; 310; 30; 20 INJECTION, SOLUTION INTRAVENOUS at 15:22

## 2022-01-01 RX ADMIN — FUROSEMIDE 20 MG: 10 INJECTION, SOLUTION INTRAVENOUS at 19:58

## 2022-01-01 RX ADMIN — ROCURONIUM BROMIDE 50 MG: 10 INJECTION INTRAVENOUS at 15:29

## 2022-01-01 RX ADMIN — SODIUM CHLORIDE, PRESERVATIVE FREE 10 ML: 5 INJECTION INTRAVENOUS at 21:29

## 2022-01-01 RX ADMIN — SODIUM BICARBONATE: 84 INJECTION, SOLUTION INTRAVENOUS at 23:28

## 2022-01-01 RX ADMIN — SODIUM CHLORIDE 500 ML: 9 INJECTION, SOLUTION INTRAVENOUS at 18:02

## 2022-01-01 RX ADMIN — HYDROMORPHONE HYDROCHLORIDE 0.5 MG: 1 INJECTION, SOLUTION INTRAMUSCULAR; INTRAVENOUS; SUBCUTANEOUS at 18:01

## 2022-01-01 RX ADMIN — ESCITALOPRAM OXALATE 20 MG: 10 TABLET ORAL at 17:59

## 2022-01-01 RX ADMIN — FENTANYL CITRATE 25 MCG: 50 INJECTION INTRAMUSCULAR; INTRAVENOUS at 15:31

## 2022-01-01 RX ADMIN — LORAZEPAM 0.5 MG: 2 INJECTION INTRAMUSCULAR; INTRAVENOUS at 18:49

## 2022-01-01 RX ADMIN — CEFAZOLIN SODIUM 1000 MG: 1 INJECTION, POWDER, FOR SOLUTION INTRAMUSCULAR; INTRAVENOUS at 23:07

## 2022-01-01 RX ADMIN — SODIUM CHLORIDE, PRESERVATIVE FREE 10 ML: 5 INJECTION INTRAVENOUS at 04:38

## 2022-01-01 RX ADMIN — LORAZEPAM 0.5 MG: 2 INJECTION INTRAMUSCULAR; INTRAVENOUS at 16:37

## 2022-01-01 RX ADMIN — METOPROLOL TARTRATE 5 MG: 1 INJECTION, SOLUTION INTRAVENOUS at 08:04

## 2022-01-01 RX ADMIN — LORAZEPAM 0.5 MG: 2 INJECTION INTRAMUSCULAR; INTRAVENOUS at 12:59

## 2022-01-01 RX ADMIN — DEXTROSE MONOHYDRATE 5 MG/HR: 50 INJECTION, SOLUTION INTRAVENOUS at 09:02

## 2022-01-01 RX ADMIN — IOPAMIDOL 75 ML: 755 INJECTION, SOLUTION INTRAVENOUS at 14:17

## 2022-01-01 RX ADMIN — SODIUM CHLORIDE, PRESERVATIVE FREE 10 ML: 5 INJECTION INTRAVENOUS at 03:35

## 2022-01-01 RX ADMIN — SUGAMMADEX 200 MG: 100 INJECTION, SOLUTION INTRAVENOUS at 16:31

## 2022-01-01 RX ADMIN — CEFAZOLIN SODIUM 2000 MG: 2 INJECTION, SOLUTION INTRAVENOUS at 15:37

## 2022-01-01 RX ADMIN — SODIUM CHLORIDE, PRESERVATIVE FREE 10 ML: 5 INJECTION INTRAVENOUS at 20:48

## 2022-01-01 RX ADMIN — SODIUM BICARBONATE: 84 INJECTION, SOLUTION INTRAVENOUS at 08:44

## 2022-01-01 RX ADMIN — HYDROCODONE BITARTRATE AND ACETAMINOPHEN 1 TABLET: 5; 325 TABLET ORAL at 21:34

## 2022-01-01 RX ADMIN — VENLAFAXINE 37.5 MG: 37.5 TABLET ORAL at 20:04

## 2022-01-01 ASSESSMENT — PULMONARY FUNCTION TESTS
PIF_VALUE: 19
PIF_VALUE: 25
PIF_VALUE: 19
PIF_VALUE: 23
PIF_VALUE: 22
PIF_VALUE: 3
PIF_VALUE: 19
PIF_VALUE: 22
PIF_VALUE: 1
PIF_VALUE: 22
PIF_VALUE: 25
PIF_VALUE: 22
PIF_VALUE: 22
PIF_VALUE: 25
PIF_VALUE: 19
PIF_VALUE: 22
PIF_VALUE: 25
PIF_VALUE: 24
PIF_VALUE: 2
PIF_VALUE: 25
PIF_VALUE: 23
PIF_VALUE: 19
PIF_VALUE: 24
PIF_VALUE: 25
PIF_VALUE: 23
PIF_VALUE: 21
PIF_VALUE: 25
PIF_VALUE: 0
PIF_VALUE: 22
PIF_VALUE: 2
PIF_VALUE: 22
PIF_VALUE: 22
PIF_VALUE: 23
PIF_VALUE: 0
PIF_VALUE: 19
PIF_VALUE: 22
PIF_VALUE: 19
PIF_VALUE: 22
PIF_VALUE: 5
PIF_VALUE: 25
PIF_VALUE: 0
PIF_VALUE: 22
PIF_VALUE: 15
PIF_VALUE: 25
PIF_VALUE: 19
PIF_VALUE: 22
PIF_VALUE: 23
PIF_VALUE: 5
PIF_VALUE: 22
PIF_VALUE: 19
PIF_VALUE: 24
PIF_VALUE: 25
PIF_VALUE: 22
PIF_VALUE: 23
PIF_VALUE: 19
PIF_VALUE: 25
PIF_VALUE: 2
PIF_VALUE: 0
PIF_VALUE: 22
PIF_VALUE: 25
PIF_VALUE: 1
PIF_VALUE: 24
PIF_VALUE: 22
PIF_VALUE: 23
PIF_VALUE: 22
PIF_VALUE: 22
PIF_VALUE: 3
PIF_VALUE: 22
PIF_VALUE: 22
PIF_VALUE: 23
PIF_VALUE: 22
PIF_VALUE: 2
PIF_VALUE: 15
PIF_VALUE: 25
PIF_VALUE: 25
PIF_VALUE: 22

## 2022-01-01 ASSESSMENT — PAIN SCALES - GENERAL
PAINLEVEL_OUTOF10: 0
PAINLEVEL_OUTOF10: 9
PAINLEVEL_OUTOF10: 10
PAINLEVEL_OUTOF10: 8
PAINLEVEL_OUTOF10: 6
PAINLEVEL_OUTOF10: 7
PAINLEVEL_OUTOF10: 7
PAINLEVEL_OUTOF10: 10
PAINLEVEL_OUTOF10: 0
PAINLEVEL_OUTOF10: 10
PAINLEVEL_OUTOF10: 6
PAINLEVEL_OUTOF10: 0

## 2022-01-01 ASSESSMENT — PAIN DESCRIPTION - PROGRESSION
CLINICAL_PROGRESSION: NOT CHANGED
CLINICAL_PROGRESSION: NOT CHANGED

## 2022-01-01 ASSESSMENT — PAIN DESCRIPTION - PAIN TYPE
TYPE: ACUTE PAIN

## 2022-01-01 ASSESSMENT — PAIN DESCRIPTION - ONSET
ONSET: ON-GOING

## 2022-01-01 ASSESSMENT — PAIN DESCRIPTION - DESCRIPTORS
DESCRIPTORS: ACHING;SHARP
DESCRIPTORS: ACHING
DESCRIPTORS: PATIENT UNABLE TO DESCRIBE
DESCRIPTORS: BURNING;ACHING

## 2022-01-01 ASSESSMENT — PAIN DESCRIPTION - LOCATION
LOCATION: HIP
LOCATION: ABDOMEN
LOCATION: ABDOMEN
LOCATION: HIP

## 2022-01-01 ASSESSMENT — PAIN SCALES - PAIN ASSESSMENT IN ADVANCED DEMENTIA (PAINAD)
FACIALEXPRESSION: 0
BREATHING: 0
CONSOLABILITY: 0
TOTALSCORE: 2
BODYLANGUAGE: 1
NEGVOCALIZATION: 1

## 2022-01-01 ASSESSMENT — PAIN DESCRIPTION - ORIENTATION
ORIENTATION: RIGHT
ORIENTATION: MID
ORIENTATION: RIGHT
ORIENTATION: RIGHT

## 2022-01-01 ASSESSMENT — PAIN DESCRIPTION - FREQUENCY
FREQUENCY: CONTINUOUS

## 2022-01-01 ASSESSMENT — ENCOUNTER SYMPTOMS: SHORTNESS OF BREATH: 1

## 2022-01-01 ASSESSMENT — PAIN - FUNCTIONAL ASSESSMENT
PAIN_FUNCTIONAL_ASSESSMENT: 0-10
PAIN_FUNCTIONAL_ASSESSMENT: PREVENTS OR INTERFERES SOME ACTIVE ACTIVITIES AND ADLS

## 2022-04-03 PROBLEM — S72.001A CLOSED RIGHT HIP FRACTURE, INITIAL ENCOUNTER (HCC): Status: ACTIVE | Noted: 2022-01-01

## 2022-04-03 NOTE — ED TRIAGE NOTES
The patient presents to the emergency department alert and oriented with a complaint of right hip pain after a fall at home yesterday. The patient denies any other injuries. The patient placed on the monitor with vital signs taken. Assessment as follows; Skin is pink, warm and dry. Right leg is shortened and rotated inward.

## 2022-04-03 NOTE — H&P
Chato Sanchez MD    History & Physical        Reason for admission: Mechanical fall with right hip pain    History Obtained From:  patient, family member - daughter    HISTORY OF PRESENT ILLNESS:    The patient is a 80 y.o. female who presents to the emergency room with complains of sustaining a fall 1 day prior to admission. Patient lives at her home with her grandson. Patient's daughter Viola Workman visits her every day. Yesterday when she visited her mother she noticed she was on the floor due to the mechanical fall. She assisted her mother into the bed. Patient had significant pain in the right lower extremity. Patient continued to have the pain earlier today. Family had called Dr. Teresa Player office and they are advised to take her to the emergency room. Patient was seen and evaluated by Ahmet Carrillo, physician assistant. Further work-up revealed that she has right hip fracture. I have seen the patient in the emergency room and had a discussion with the patient's daughter as well. Patient denies any fever chills chest pain shortness of breath headaches. Pt denies any syncope or passing out.       Past Medical History:        Diagnosis Date    Arthritis     Atrial fibrillation ([address]     CAD (coronary artery disease)     Chronic kidney disease     GERD (gastroesophageal reflux disease)     Hypertension     Mitral valve prolapse     Psychiatric problem        Past Surgical History:        Procedure Laterality Date    CATARACT REMOVAL      CHOLECYSTECTOMY      FRACTURE SURGERY      HYSTERECTOMY      JOINT REPLACEMENT      ORIF FEMUR DECOMPRESSION Left 12/02/2013       Medications Prior to Admission:    Medications Prior to Admission: escitalopram (LEXAPRO) 20 MG tablet, Take 20 mg by mouth daily  mirabegron (MYRBETRIQ) 25 MG TB24, Take 25 mg by mouth daily  QUEtiapine (SEROQUEL) 25 MG tablet, Take 25 mg by mouth 2 times daily  venlafaxine (EFFEXOR) 37.5 MG tablet, Take 37.5 mg by mouth 3 times daily  furosemide (LASIX) 20 MG tablet, Take 1 tablet by mouth 2 times daily Pt has this medications at home. docusate sodium (COLACE) 100 MG capsule, Take 100 mg by mouth 2 times daily as needed for Constipation  omeprazole (PRILOSEC) 20 MG delayed release capsule, Take 20 mg by mouth daily   lorazepam (ATIVAN) 0.5 MG tablet, Take 0.5 mg by mouth 2 times daily as needed     Allergies:  Biaxin [clarithromycin] and Codeine    Social History:   TOBACCO:   reports that she has quit smoking. She has never used smokeless tobacco.  ETOH:   reports no history of alcohol use.       Family History:       Problem Relation Age of Onset    Heart Disease Mother     Heart Disease Father     Heart Disease Sister     Heart Disease Brother        REVIEW OF SYSTEMS:  Constitutional: negative  Eyes: negative  Ears, nose, mouth, throat, and face: negative  Respiratory: negative  Cardiovascular: negative  Gastrointestinal: negative  Genitourinary:negative  Integument/breast: negative  Hematologic/lymphatic: negative  Musculoskeletal:positive for bone pain  Neurological: negative  Behavioral/Psych: negative  Endocrine: negative  Allergic/Immunologic: negative    PHYSICAL EXAM:  /76   Pulse 91   Temp 97.8 °F (36.6 °C) (Oral)   Resp 20   Ht 5' 2\" (1.575 m)   Wt 124 lb (56.2 kg)   SpO2 100%   BMI 22.68 kg/m²   /76   Pulse 91   Temp 97.8 °F (36.6 °C) (Oral)   Resp 20   Ht 5' 2\" (1.575 m)   Wt 124 lb (56.2 kg)   SpO2 100%   BMI 22.68 kg/m²     General Appearance:    Alert, cooperative, no distress, appears stated age   Head:    Normocephalic, without obvious abnormality, atraumatic   Eyes:    PERRL, conjunctiva/corneas clear, EOM's intact, fundi     benign, both eyes   Ears:    Normal TM's and external ear canals, both ears   Nose:   Nares normal, septum midline, mucosa normal, no drainage    or sinus tenderness   Throat:   Lips, mucosa, and tongue normal; teeth and gums normal   Neck:   Supple, symmetrical, trachea midline, no adenopathy;     thyroid:  no enlargement/tenderness/nodules; no carotid    bruit or JVD   Back:     Symmetric, no curvature, ROM normal, no CVA tenderness   Lungs:     Clear to auscultation bilaterally, respirations unlabored   Chest Wall:    No tenderness or deformity    Heart:    Regular rate and rhythm, S1 and S2 normal, no murmur, rub   or gallop       Abdomen:     Soft, non-tender, bowel sounds active all four quadrants,     no masses, no organomegaly           Extremities: There is deformity of the right hip with internal rotation and shortening of the right leg. Pelvis stable and nontender. Remainder bilateral upper and lower extremities nontender. No tenderness palpation of the cervical, thoracic, lumbar spine or paraspinal muscles. Extensive bruising noted over the left lower leg and the right medial thigh. Pulses:   2+ and symmetric all extremities   Skin:   Skin color, texture, turgor normal, no rashes or lesions   Lymph nodes:   Cervical, supraclavicular, and axillary nodes normal   Neurologic:   CNII-XII intact, normal strength, sensation and reflexes     throughout     DATA:  EKG:  I have reviewed EKG with the following interpretation: Atrial fibrillation with rapid ventricular response.   Ventricular rate 126 bpm.  LABS:   Recent Results (from the past 24 hour(s))   EKG 12 Lead    Collection Time: 04/03/22  2:04 PM   Result Value Ref Range    Ventricular Rate 126 BPM    Atrial Rate 136 BPM    QRS Duration 96 ms    Q-T Interval 354 ms    QTc Calculation (Bazett) 512 ms    R Axis -7 degrees    T Axis -31 degrees    Diagnosis       Atrial fibrillation with rapid ventricular response  Possible Anterior infarct (cited on or before 14-MAR-2019)  Abnormal ECG  When compared with ECG of 22-AUG-2021 12:48,  premature supraventricular complexes are no longer present  Questionable change in initial forces of Septal leads     CBC with Auto Differential    Collection Time: 04/03/22  2:15 PM   Result Value Ref Range    WBC 7.6 4.0 - 10.5 K/CU MM    RBC 3.36 (L) 4.2 - 5.4 M/CU MM    Hemoglobin 10.1 (L) 12.5 - 16.0 GM/DL    Hematocrit 31.7 (L) 37 - 47 %    MCV 94.3 78 - 100 FL    MCH 30.1 27 - 31 PG    MCHC 31.9 (L) 32.0 - 36.0 %    RDW 14.6 11.7 - 14.9 %    Platelets 452 (L) 699 - 440 K/CU MM    MPV 10.6 7.5 - 11.1 FL    Differential Type AUTOMATED DIFFERENTIAL     Segs Relative 75.2 (H) 36 - 66 %    Lymphocytes % 13.8 (L) 24 - 44 %    Monocytes % 10.4 (H) 0 - 4 %    Eosinophils % 0.0 0 - 3 %    Basophils % 0.3 0 - 1 %    Segs Absolute 5.7 K/CU MM    Lymphocytes Absolute 1.1 K/CU MM    Monocytes Absolute 0.8 K/CU MM    Eosinophils Absolute 0.0 K/CU MM    Basophils Absolute 0.0 K/CU MM    Nucleated RBC % 0.0 %    Total Nucleated RBC 0.0 K/CU MM    Total Immature Neutrophil 0.02 K/CU MM    Immature Neutrophil % 0.3 0 - 0.43 %   Comprehensive Metabolic Panel w/ Reflex to MG    Collection Time: 04/03/22  2:15 PM   Result Value Ref Range    Sodium 135 135 - 145 MMOL/L    Potassium 4.8 3.5 - 5.1 MMOL/L    Chloride 102 99 - 110 mMol/L    CO2 24 21 - 32 MMOL/L    BUN 20 6 - 23 MG/DL    CREATININE 0.9 0.6 - 1.1 MG/DL    Glucose 91 70 - 99 MG/DL    Calcium 8.4 8.3 - 10.6 MG/DL    Albumin 3.8 3.4 - 5.0 GM/DL    Total Protein 5.8 (L) 6.4 - 8.2 GM/DL    Total Bilirubin 1.7 (H) 0.0 - 1.0 MG/DL    ALT 11 10 - 40 U/L    AST 32 15 - 37 IU/L    Alkaline Phosphatase 127 40 - 129 IU/L    GFR Non- 58 (L) >60 mL/min/1.73m2    GFR African American >60 >60 mL/min/1.73m2    Anion Gap 9 4 - 16   Troponin    Collection Time: 04/03/22  2:15 PM   Result Value Ref Range    Troponin T <0.010 <0.01 NG/ML   CK    Collection Time: 04/03/22  2:15 PM   Result Value Ref Range    Total  (H) 26 - 140 IU/L     Imaging:  XR HIP 2-3 VW W PELVIS RIGHT [[id number]]    Collected: 04/03/22 1451    Updated: 04/03/22 1455    Narrative:     EXAMINATION:   ONE XRAY VIEW OF THE PELVIS AND TWO XRAY VIEWS RIGHT HIP     4/3/2022 2:39 pm     COMPARISON:   None. HISTORY:   ORDERING SYSTEM PROVIDED HISTORY: trauma, deformity   TECHNOLOGIST PROVIDED HISTORY:   Reason for exam:->trauma, deformity   Reason for Exam: trauma, deformity     FINDINGS:   There is a displaced angulated intertrochanteric fracture fracture of the   right proximal femur.  Soft tissue swelling is noted.  Postsurgical changes   noted in the left hip. Impression:     Fracture as above      CT Head WO Contrast [[id number]]    Collected: 04/03/22 1534    Updated: 04/03/22 1607    Narrative:     EXAMINATION:   CT OF THE HEAD WITHOUT CONTRAST  4/3/2022 3:16 pm     TECHNIQUE:   CT of the head was performed without the administration of intravenous   contrast. Dose modulation, iterative reconstruction, and/or weight based   adjustment of the mA/kV was utilized to reduce the radiation dose to as low   as reasonably achievable. COMPARISON:   August 22, 2021     HISTORY:   ORDERING SYSTEM PROVIDED HISTORY: Trauma. TECHNOLOGIST PROVIDED HISTORY:   Reason for exam:->trauma   Has a \"code stroke\" or \"stroke alert\" been called? ->No   Decision Support Exception - unselect if not a suspected or confirmed   emergency medical condition->Emergency Medical Condition (MA)   Reason for Exam: Trauma/fall. FINDINGS:   BRAIN/VENTRICLES: There is no acute intracranial hemorrhage, mass effect or   midline shift.  No abnormal extra-axial fluid collection.  The gray-white   differentiation is maintained without evidence of an acute infarct.  There is   no evidence of hydrocephalus. There are rather extensive, confluent white matter hypodensities involving   the periventricular and deep subcortical white matter, most likely   representing chronic microvascular ischemic disease. 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 No new foci of abnormal attenuation are present within the brain. ORBITS: The visualized portion of the orbits demonstrate no acute abnormality. SINUSES: The visualized paranasal sinuses and mastoid air cells demonstrate   no acute abnormality. SOFT TISSUES/SKULL:  No acute abnormality of the visualized skull or soft   tissues. Impression:     No acute intracranial abnormality. Chronic microvascular ischemic changes and old left cerebellar infarct,   stable. CT CERVICAL SPINE WO CONTRAST [[id number]]    Collected: 04/03/22 1537    Updated: 04/03/22 1606    Narrative:     EXAMINATION:   CT OF THE CERVICAL SPINE WITHOUT CONTRAST 4/3/2022 3:15 pm     TECHNIQUE:   CT of the cervical spine was performed without the administration of   intravenous contrast. Multiplanar reformatted images are provided for review. Dose modulation, iterative reconstruction, and/or weight based adjustment of   the mA/kV was utilized to reduce the radiation dose to as low as reasonably   achievable. COMPARISON:   August 22, 2021     HISTORY:   ORDERING SYSTEM PROVIDED HISTORY: Trauma. TECHNOLOGIST PROVIDED HISTORY:   Reason for exam:->trauma   Decision Support Exception - unselect if not a suspected or confirmed   emergency medical condition->Emergency Medical Condition (MA)   Reason for Exam: Trauma/fall. FINDINGS:   BONES/ALIGNMENT: There is no acute fracture or traumatic malalignment.  Bone   mineral density appears markedly decreased, which does limit the sensitivity   of the exam.     DEGENERATIVE CHANGES: Moderate multilevel degenerative disc disease as well   as facet and uncovertebral joint osteoarthritis is present throughout the   lumbar spine.  These degenerative changes are most prominent at C5-6. Stable superior endplate compression deformity of T1. No evidence of bony spinal canal stenosis. SOFT TISSUES: There is no prevertebral soft tissue swelling. Impression:     No acute abnormality of the cervical spine.       XR CHEST PORTABLE [[id number]]    Collected: 04/03/22 1456    Updated: 04/03/22 1500    Narrative:     EXAMINATION: ONE XRAY VIEW OF THE CHEST     4/3/2022 2:39 pm     COMPARISON:   10/13/2019     HISTORY:   ORDERING SYSTEM PROVIDED HISTORY: fall   TECHNOLOGIST PROVIDED HISTORY:   Reason for exam:->fall   Reason for Exam: fall     Initial encounter     FINDINGS:   Diffuse osteopenia.  Remote right 3rd rib fracture.  No focal consolidation,   pleural effusion or pneumothorax.  The cardiomediastinal silhouette is   stable.  No overt pulmonary edema.  The osseous structures are stable. Impression:     No new acute cardiopulmonary findings. XR TIBIA FIBULA LEFT (2 VIEWS) [[id number]]    Collected: 04/03/22 1452    Updated: 04/03/22 1500    Narrative:     EXAMINATION:   XRAY VIEWS OF THE LEFT TIBIA AND FIBULA     4/3/2022 2:40 pm     COMPARISON:   None. HISTORY:   ORDERING SYSTEM PROVIDED HISTORY: trauma   TECHNOLOGIST PROVIDED HISTORY:   Reason for exam:->trauma   Reason for Exam: trauma     FINDINGS:   No acute fracture or malalignment. There are moderate degenerative changes     Soft tissues unremarkable. Impression:           ASSESSMENT / PLAN:       1. Right hip fracture secondary to mechanical fall. Dr. Preet Infante from orthopedic surgery has been consulted. 2.  Atrial fibrillation with rapid ventricular response. Due to recent falls patient has been off her anticoagulation. Currently rate is well controlled. 3.  Hypertension. Monitor blood pressure. 4.  Anxiety and depression. Continue Seroquel and Lexapro. 5.  Gastroesophageal reflux disease. Continue proton pump inhibitor. 6.  Mildly elevated CPK due to the fall. We will repeat CPK in the morning. 7.  Chronic anemia.      Nunu Bowers MD

## 2022-04-03 NOTE — ED NOTES
Phone report called to Javier Nichols RN for transfer to unit and assumption of care. Noted that pt room remains dirty at this time.      Shea Coles RN  04/03/22 [id number]

## 2022-04-03 NOTE — PROGRESS NOTES
4 Eyes Skin Assessment     NAME:  Jesus Alberto Barber OF BIRTH:  2/18/1925  MEDICAL RECORD NUMBER:  [id number]    The patient is being assess for  Admission    I agree that 2 RN's have performed a thorough Head to Toe Skin Assessment on the patient. ALL assessment sites listed below have been assessed. Areas assessed by both nurses:    Head, Face, Ears, Shoulders, Back, Chest, Arms, Elbows, Hands, Sacrum. Buttock, Coccyx, Ischium and Legs. Feet and Heels        Does the Patient have a Wound? Yes wound(s) were present on assessment.  LDA wound assessment was Initiated and completed        Samir Prevention initiated:  No   Wound Care Orders initiated:  No    Pressure Injury (Stage 3,4, Unstageable, DTI, NWPT, and Complex wounds) if present place consult order under [de-identified] No    New and Established Ostomies if present place consult order under : No      Nurse 1 eSignature: Electronically signed by Slava Martinez LPN on 4/4/76 at 2:19 PM EDT    **SHARE this note so that the co-signing nurse is able to place an eSignature**    Nurse 2 eSignature: Electronically signed by Beck Turner RN on 4/3/2022 at 7:02 PM\

## 2022-04-03 NOTE — ED PROVIDER NOTES
Patient Identification  Joseph Graves is a 80 y.o. female    Chief Complaint  Fall (Right hip pain)      HPI  (History provided by patient)  This is a 80 y.o. female who was brought in by EMS for chief complaint of fall. Patient apparently fell yesterday, not sure why she fell, found down by family and moved into bed. Today while trying to change her clothing she was having increasing pain so EMS was called. She denies other injuries from the fall. Pain is 10 out of 10 in the right hip and constant since onset. REVIEW OF SYSTEMS    Constitutional:  Denies fever, chills  HENT:  Denies sore throat or ear pain   Eyes: Denies vision changes, eye pain  Cardiovascular:  Denies chest pain, syncope  Respiratory:  Denies shortness of breath, cough   GI:  Denies abdominal pain, nausea, vomiting  :  Denies dysuria, discharge  Musculoskeletal:  Denies back pain.  + hip pain  Skin:  Denies rash, pruritis  Neurologic:  Denies headache, focal weakness, or sensory changes     See HPI and nursing notes for additional information     I have reviewed the following nursing documentation:  Allergies: Allergies   Allergen Reactions    Biaxin [Clarithromycin]     Codeine Rash       Past medical history:  has a past medical history of Arthritis, Atrial fibrillation ([facility] [address] CAD (coronary artery disease), Chronic kidney disease, GERD (gastroesophageal reflux disease), Hypertension, Mitral valve prolapse, and Psychiatric problem. Past surgical history:  has a past surgical history that includes Cholecystectomy; Hysterectomy; joint replacement; Cataract removal; fracture surgery; and orif femur decompression (Left, 12/02/2013). Home medications:   Prior to Admission medications    Medication Sig Start Date End Date Taking?  Authorizing Provider   escitalopram (LEXAPRO) 20 MG tablet Take 20 mg by mouth daily   Yes Historical Provider, MD   mirabegron (MYRBETRIQ) 25 MG TB24 Take 25 mg by mouth daily   Yes Historical Provider, MD   QUEtiapine (SEROQUEL) 25 MG tablet Take 25 mg by mouth 2 times daily   Yes Historical Provider, MD   venlafaxine (EFFEXOR) 37.5 MG tablet Take 37.5 mg by mouth 3 times daily   Yes Historical Provider, MD   furosemide (LASIX) 20 MG tablet Take 1 tablet by mouth 2 times daily Pt has this medications at home. 11/20/18  Yes Jessika Tyler MD   docusate sodium (COLACE) 100 MG capsule Take 100 mg by mouth 2 times daily as needed for Constipation   Yes Historical Provider, MD   omeprazole (PRILOSEC) 20 MG delayed release capsule Take 20 mg by mouth daily    Yes Historical Provider, MD   lorazepam (ATIVAN) 0.5 MG tablet Take 0.5 mg by mouth 2 times daily as needed     Historical Provider, MD       Social history:  reports that she has quit smoking. She has never used smokeless tobacco. She reports that she does not drink alcohol and does not use drugs. Family history:    Family History   Problem Relation Age of Onset    Heart Disease Mother     Heart Disease Father     Heart Disease Sister     Heart Disease Brother          Exam  /76   Pulse 91   Temp 97.8 °F (36.6 °C) (Oral)   Resp 20   Ht 5' 2\" (1.575 m)   Wt 124 lb (56.2 kg)   SpO2 100%   BMI 22.68 kg/m²   Nursing note and vitals reviewed. Constitutional: Well developed, well nourished. Appears uncomfortable  HENT:      Head: Normocephalic and atraumatic. Ears: External ears normal.      Nose: Nose normal.     Mouth: Membrane mucosa tacky and pink. No posterior oropharynx erythema or tonsillar edema  Eyes: Anicteric sclera. No discharge, PERRL  Neck: Supple. Trachea midline. Cardiovascular: RRR, no murmurs, rubs, or gallops, radial pulses 2+ bilaterally. Pulmonary/Chest: Effort normal. No respiratory distress. CTAB. No stridor. No wheezes. No rales. Abdominal: Soft. Nontender to palpation. No distension. No guarding, rebound tenderness, or evidence of ascites. : No CVA tenderness.     Musculoskeletal: There is deformity of the right hip with internal rotation and shortening of the right leg. Pelvis stable and nontender. Remainder bilateral upper and lower extremities nontender. No tenderness palpation of the cervical, thoracic, lumbar spine or paraspinal muscles. Extensive bruising noted over the left lower leg and the right medial thigh. NEUROLOGICAL: Awake and alert. GCS 15. Cranial nerves 2-12 grossly intact. Strength 5/5 throughout. Light touch sensation intact throughout. Skin: Warm and dry. No rash. Psychiatric: Normal mood and affect. Behavior is normal.      Radiographs (if obtained):  [] The following radiograph was interpreted by myself in the absence of a radiologist:   [x] Radiologist's Report Reviewed:  CT CERVICAL SPINE WO CONTRAST   Final Result   No acute abnormality of the cervical spine. CT Head WO Contrast   Final Result   No acute intracranial abnormality. Chronic microvascular ischemic changes and old left cerebellar infarct,   stable. XR TIBIA FIBULA LEFT (2 VIEWS)   Final Result   1. No acute fracture or malalignment. XR HIP 2-3 VW W PELVIS RIGHT   Final Result   Fracture as above         XR CHEST PORTABLE   Final Result   No new acute cardiopulmonary findings.                 Labs  Results for orders placed or performed during the hospital encounter of 04/03/22   CBC with Auto Differential   Result Value Ref Range    WBC 7.6 4.0 - 10.5 K/CU MM    RBC 3.36 (L) 4.2 - 5.4 M/CU MM    Hemoglobin 10.1 (L) 12.5 - 16.0 GM/DL    Hematocrit 31.7 (L) 37 - 47 %    MCV 94.3 78 - 100 FL    MCH 30.1 27 - 31 PG    MCHC 31.9 (L) 32.0 - 36.0 %    RDW 14.6 11.7 - 14.9 %    Platelets 017 (L) 785 - 440 K/CU MM    MPV 10.6 7.5 - 11.1 FL    Differential Type AUTOMATED DIFFERENTIAL     Segs Relative 75.2 (H) 36 - 66 %    Lymphocytes % 13.8 (L) 24 - 44 %    Monocytes % 10.4 (H) 0 - 4 %    Eosinophils % 0.0 0 - 3 %    Basophils % 0.3 0 - 1 %    Segs Absolute 5.7 K/CU MM    Lymphocytes Absolute 1.1 K/CU MM    Monocytes Absolute 0.8 K/CU MM    Eosinophils Absolute 0.0 K/CU MM    Basophils Absolute 0.0 K/CU MM    Nucleated RBC % 0.0 %    Total Nucleated RBC 0.0 K/CU MM    Total Immature Neutrophil 0.02 K/CU MM    Immature Neutrophil % 0.3 0 - 0.43 %   Comprehensive Metabolic Panel w/ Reflex to MG   Result Value Ref Range    Sodium 135 135 - 145 MMOL/L    Potassium 4.8 3.5 - 5.1 MMOL/L    Chloride 102 99 - 110 mMol/L    CO2 24 21 - 32 MMOL/L    BUN 20 6 - 23 MG/DL    CREATININE 0.9 0.6 - 1.1 MG/DL    Glucose 91 70 - 99 MG/DL    Calcium 8.4 8.3 - 10.6 MG/DL    Albumin 3.8 3.4 - 5.0 GM/DL    Total Protein 5.8 (L) 6.4 - 8.2 GM/DL    Total Bilirubin 1.7 (H) 0.0 - 1.0 MG/DL    ALT 11 10 - 40 U/L    AST 32 15 - 37 IU/L    Alkaline Phosphatase 127 40 - 129 IU/L    GFR Non- 58 (L) >60 mL/min/1.73m2    GFR African American >60 >60 mL/min/1.73m2    Anion Gap 9 4 - 16   Troponin   Result Value Ref Range    Troponin T <0.010 <0.01 NG/ML   CK   Result Value Ref Range    Total  (H) 26 - 140 IU/L   EKG 12 Lead   Result Value Ref Range    Ventricular Rate 126 BPM    Atrial Rate 136 BPM    QRS Duration 96 ms    Q-T Interval 354 ms    QTc Calculation (Bazett) 512 ms    R Axis -7 degrees    T Axis -31 degrees    Diagnosis       Atrial fibrillation with rapid ventricular response  Possible Anterior infarct (cited on or before 14-MAR-2019)  Abnormal ECG  When compared with ECG of 22-AUG-2021 12:48,  premature supraventricular complexes are no longer present  Questionable change in initial forces of Septal leads           MDM  Patient presents for fall, right hip pain. Found to have right hip fracture. Pain controlled with fentanyl. Remainder of work-up reveals atrial fibrillation with RVR, heart rate has normalized without treatment. She has a history of A. fib in the past, not currently anticoagulated. Other imaging reveals no other injury. Laboratory testing is fairly unremarkable.   Patient seen in ED by Dr. Jasmyne Jose who agreed to admit. Spoke with Dr. Jacey Riggs with ortho who will consult in hospital.     I have independently evaluated this patient. Final Impression  1. Closed fracture of right hip, initial encounter ([facility] [address]    2. Atrial fibrillation, unspecified type (HCC)        Blood pressure 120/76, pulse 91, temperature 97.8 °F (36.6 °C), temperature source Oral, resp. rate 20, height 5' 2\" (1.575 m), weight 124 lb (56.2 kg), SpO2 100 %. Disposition:  Admit to med/surg floor in stable condition. Patient was given scripts for the following medications. I counseled patient how to take these medications. Current Discharge Medication List          This chart was generated using the [address] [place] [address] dictation system. I created this record but it may contain dictation errors given the limitations of this technology.        Carmen Coates PA-C  04/03/22 2139

## 2022-04-03 NOTE — CONSULTS
Consultation    Ranjit Park (2/18/1925)    4/3/2022      CHIEF COMPLAINT:  Right hip fracture    History Obtained From:  patient, electronic medical record    HISTORY OF PRESENT ILLNESS:      The patient is a 80 y.o. female  who presents with right hip pain and deformity following a fall. Xray evaluation at [facility] ED identified a right hip IT fracture. Patient was admitted for medical management and orthopedic consultation was obtained for fracture care. Past Medical History         Diagnosis Date    Arthritis     Atrial fibrillation ([address]     CAD (coronary artery disease)     Chronic kidney disease     GERD (gastroesophageal reflux disease)     Hypertension     Mitral valve prolapse     Psychiatric problem        Past Surgical History         Procedure Laterality Date    CATARACT REMOVAL      CHOLECYSTECTOMY      FRACTURE SURGERY      HYSTERECTOMY      JOINT REPLACEMENT      ORIF FEMUR DECOMPRESSION Left 12/02/2013       Medications Prior to Admission:     Prior to Admission medications    Medication Sig Start Date End Date Taking? Authorizing Provider   escitalopram (LEXAPRO) 20 MG tablet Take 20 mg by mouth daily   Yes Historical Provider, MD   mirabegron (MYRBETRIQ) 25 MG TB24 Take 25 mg by mouth daily   Yes Historical Provider, MD   QUEtiapine (SEROQUEL) 25 MG tablet Take 25 mg by mouth 2 times daily   Yes Historical Provider, MD   venlafaxine (EFFEXOR) 37.5 MG tablet Take 37.5 mg by mouth 3 times daily   Yes Historical Provider, MD   furosemide (LASIX) 20 MG tablet Take 1 tablet by mouth 2 times daily Pt has this medications at home.  11/20/18  Yes Lucy Pinto MD   docusate sodium (COLACE) 100 MG capsule Take 100 mg by mouth 2 times daily as needed for Constipation   Yes Historical Provider, MD   omeprazole (PRILOSEC) 20 MG delayed release capsule Take 20 mg by mouth daily    Yes Historical Provider, MD lorazepam (ATIVAN) 0.5 MG tablet Take 0.5 mg by mouth 2 times daily as needed     Historical Provider, MD       Allergies     Biaxin [clarithromycin] and Codeine    Social History   TOBACCO:   reports that she has quit smoking. She has never used smokeless tobacco.  ETOH:   reports no history of alcohol use. Patient currently lives in a nursing home    Family History         Problem Relation Age of Onset    Heart Disease Mother     Heart Disease Father     Heart Disease Sister     Heart Disease Brother        Review of Systems   Constitutional:  No weight loss, no night sweats  Eyes:  No visual changes  ENT:  No nasal drainage or ear pain  CV:  No chest pain  PULM:  No cough, no shortness of breath  GI:  No nausea, vomiting, diarrhea  :  No dysuria  Musc:  No weakness of arms or legs  Neuro: No numbness, tingling or parethesias  Skin:  No rashes  Psych: No depression symptoms    Physical Exam:    Vitals: /76   Pulse 108   Temp 97.8 °F (36.6 °C) (Oral)   Resp 20   Ht 5' 2\" (1.575 m)   Wt 124 lb (56.2 kg)   SpO2 100%   BMI 22.68 kg/m² ,  Body mass index is 22.68 kg/m². General appearance: alert, appears stated age and cooperative  Skin: Skin color, texture, turgor normal. No rashes or lesions  HEENT: Head: Normal, normocephalic, atraumatic. Neck: supple, symmetrical, trachea midline  Extremities:    Affected hip tender to palpation. Pain with PROM to hip. No knee or ankle deformity or tenderness   Good distal pulses with good capillary refill   Able to dorsiflex and plantar flex ankle and toes-bilaterally   No tenderness over bilateral wrist, elbow or shoulders.   Neurologic: Mental status: Alert, oriented, thought content appropriate    Labs     CBC:   Recent Labs     04/03/22  1415   WBC 7.6   HGB 10.1*   *     BMP:    Recent Labs     04/03/22  1415      K 4.8      CO2 24   BUN 20   CREATININE 0.9   GLUCOSE 91     INR: No results for input(s): INR in the last 72 hours.      X-ray view   Imaging Review Right Hip  FINDINGS:   There is a displaced angulated intertrochanteric fracture fracture of the   right proximal femur.  Soft tissue swelling is noted.  Postsurgical changes   noted in the left hip. Patient Active Problem List   Diagnosis Code    Hypertension I10    Intermittent atrial fibrillation ([facility]) I48.0    Anemia D64.9    Nausea without vomiting R11.0    Acute diastolic (congestive) heart failure ([facility]) I50.31    Dyspnea R06.00    Closed right hip fracture, initial encounter ([facility]) S72.001A       Assessment and Plan     1. Right Hip intertrochanteric fracture      Based on the fracture pattern I recommend trochanteric nail fixation. The goal of surgical intervention is pain control and mobility. Postoperatively the patient will remain in the hospital for pain control, physical therapy, as well as DVT prophylaxis. Surgical risks were explained to the patient as well as the family; these included but not limited to infection, nonunion, malunion, continued pain after surgical intervention. Risks also include post-operative heart attack, stroke and death. There is also at risk for loss of ambulatory status or a decreased level of ambulatory status. Postoperatively the patient will be weightbearing as tolerated and physical therapy will work with the patient on a daily basis. We will order the use incentive spirometry to prevent pneumonia postoperatively. The patient will receive 24 hours of antibiotics postoperatively and the Peace will be planned to be discontinued on postoperative day #2. The patient and family understands the risks and benefits of surgery and wishes to proceed with operative intervention.      Chris Tapia MD, MD

## 2022-04-03 NOTE — ED NOTES
The patient was placed on oxygen by nasal cannula due to O2 saturation of 89%. O2  Saturation increased to 99%.      Carly Thompson, LEYDA  04/03/22 0256

## 2022-04-04 NOTE — PROGRESS NOTES
Pt returned from PACU confused and agitated. This nurse assessed pt's vitals, bp was 74/60, Dr Aquino Self consulted about CT results and pts status, ordered 500ml iv bolus 0.9NS. Pt bp improved but continued to be confused and agitated. This nurse contacted Dr. Aquino Self to help pt calm, 0.5mg ativan ordered. Family at bedside, updated on pts condition and situation. All questions answered.

## 2022-04-04 NOTE — PROGRESS NOTES
Comprehensive Nutrition Assessment    Type and Reason for Visit:  Initial (low BMI for age)    Nutrition Recommendations/Plan:   · Continue NPO  · Advance diet as timely as able post op  · Order chocolate standard high peterson supplements once appropriate    Nutrition Assessment:  Pt admitted form home with right hip fracture. H/O CHF, AF. Currently NPO for surgical repair. Recent significant wt loss is noted. Pt is sleeping during my room visit. Likes chocolate oral supplement per history. Will continue to follow as high nutrition risk. Malnutrition Assessment:  Malnutrition Status:  Insufficient data    Context:  Chronic Illness       Estimated Daily Nutrient Needs:  Energy (kcal):  1067-7653 (30-35 kcal/kg); Weight Used for Energy Requirements:  Current     Protein (g):  59-69 (1.2-1.4 g/kg);  Weight Used for Protein Requirements:  Current        Fluid (ml/day):  2408-3387; Method Used for Fluid Requirements:  1 ml/kcal      Wounds:  Skin Tears       Current Nutrition Therapies:    Diet NPO    Anthropometric Measures:  · Height: 5' 2\" (157.5 cm)  · Current Body Weight: 107 lb 14.4 oz (48.9 kg)   · Admission Body Weight: 107 lb 14.4 oz (48.9 kg)    · Usual Body Weight: 124 lb (56.2 kg) (8/22/21)     · Ideal Body Weight: 110 lbs; % Ideal Body Weight 98.1 %   · BMI: 19.7  · BMI Categories: Underweight (BMI less than 22) age over 72       Nutrition Diagnosis:   · Inadequate oral intake related to acute injury/trauma as evidenced by NPO or clear liquid status due to medical condition    Nutrition Interventions:   Food and/or Nutrient Delivery:  Continue NPO  Nutrition Education/Counseling:  No recommendation at this time   Coordination of Nutrition Care:  Continue to monitor while inpatient    Goals:  Pt will tolerate diet advancement within 24-48 hrs       Nutrition Monitoring and Evaluation:   Behavioral-Environmental Outcomes:  None Identified   Food/Nutrient Intake Outcomes:  Diet Advancement/Tolerance  Physical

## 2022-04-04 NOTE — PROGRESS NOTES
1650: Arrived to PACU from OR. Monitors applied, alarms on. Report obtained from Chiquis Gibbons and Allan. Patient slightly restless, pulling at kerlex wrapped around her left arm covering her IV. Reassurance given. Positioned self onto left side. Warm blankets on  1720: Resting quietly with eyes closed. 1738: Transported to room 1125.

## 2022-04-04 NOTE — ANESTHESIA PRE PROCEDURE
Department of Anesthesiology  Preprocedure Note       Name:  Angel Goodson   Age:  80 y.o.  :  1925                                          MRN:  [id number]         Date:  2022      Surgeon: Stephani Fontanez):  Hina Rick MD    Procedure: Procedure(s):  RIGHT FEMUR IM NAIL MATT INSERTION    Medications prior to admission:   Prior to Admission medications    Medication Sig Start Date End Date Taking? Authorizing Provider   escitalopram (LEXAPRO) 20 MG tablet Take 20 mg by mouth daily   Yes Historical Provider, MD   mirabegron (MYRBETRIQ) 25 MG TB24 Take 25 mg by mouth daily   Yes Historical Provider, MD   QUEtiapine (SEROQUEL) 25 MG tablet Take 25 mg by mouth 2 times daily   Yes Historical Provider, MD   venlafaxine (EFFEXOR) 37.5 MG tablet Take 37.5 mg by mouth 3 times daily   Yes Historical Provider, MD   furosemide (LASIX) 20 MG tablet Take 1 tablet by mouth 2 times daily Pt has this medications at home.  18  Yes Swati Encinas MD   docusate sodium (COLACE) 100 MG capsule Take 100 mg by mouth 2 times daily as needed for Constipation   Yes Historical Provider, MD   omeprazole (PRILOSEC) 20 MG delayed release capsule Take 20 mg by mouth daily    Yes Historical Provider, MD   lorazepam (ATIVAN) 0.5 MG tablet Take 0.5 mg by mouth 2 times daily as needed     Historical Provider, MD       Current medications:    Current Facility-Administered Medications   Medication Dose Route Frequency Provider Last Rate Last Admin    docusate sodium (COLACE) capsule 100 mg  100 mg Oral BID PRN Rhett Gallo MD        escitalopram (LEXAPRO) tablet 20 mg  20 mg Oral Daily Rhett Gallo MD   20 mg at 22 1759    pantoprazole (PROTONIX) tablet 40 mg  40 mg Oral QAM AC Rhett Gallo MD   40 mg at 22 0542    QUEtiapine (SEROQUEL) tablet 25 mg  25 mg Oral BID Rhett Gallo MD   25 mg at 22 2004    venlafaxine [facility]) tablet 37.5 mg  37.5 mg Oral TID Rhett Gallo MD   37.5 mg at 22 2004    0.9 % sodium chloride infusion   IntraVENous Continuous Ge Alicea MD 75 mL/hr at 04/04/22 0722 New Bag at 04/04/22 0722    sodium chloride flush 0.9 % injection 5-40 mL  5-40 mL IntraVENous 2 times per day Ge Alicea MD        sodium chloride flush 0.9 % injection 5-40 mL  5-40 mL IntraVENous PRN Ge Alicea MD   10 mL at 04/04/22 0442    0.9 % sodium chloride infusion   IntraVENous PRN Ge Alicea MD        ondansetron (ZOFRAN-ODT) disintegrating tablet 4 mg  4 mg Oral Q8H PRN Ge Alicea MD        Or    ondansetron [facility]) injection 4 mg  4 mg IntraVENous Q6H PRN Ge Alicea MD   4 mg at 04/04/22 0438    polyethylene glycol (GLYCOLAX) packet 17 g  17 g Oral Daily PRN Ge Alicea MD        acetaminophen (TYLENOL) tablet 650 mg  650 mg Oral Q6H PRN Ge Alicea MD        Or    acetaminophen (TYLENOL) suppository 650 mg  650 mg Rectal Q6H PRN Ge Alicea MD        HYDROmorphone (DILAUDID) injection 0.5 mg  0.5 mg IntraVENous Q4H PRN Ge Alicea MD   0.5 mg at 04/04/22 0442    HYDROcodone-acetaminophen (NORCO) 5-325 MG per tablet 1 tablet  1 tablet Oral Q6H PRN Ge Alicea MD   1 tablet at 04/03/22 1024       Allergies:     Allergies   Allergen Reactions    Biaxin [Clarithromycin]     Codeine Rash       Problem List:    Patient Active Problem List   Diagnosis Code    Hypertension I10    Intermittent atrial fibrillation (HCC) I48.0    Anemia D64.9    Nausea without vomiting R11.0    Acute diastolic (congestive) heart failure (HCC) I50.31    Dyspnea R06.00    Closed right hip fracture, initial encounter ([facility] [address] S72.001A       Past Medical History:        Diagnosis Date    Arthritis     Atrial fibrillation ([facility]     CAD (coronary artery disease)     Chronic kidney disease     GERD (gastroesophageal reflux disease)     Hypertension     Mitral valve prolapse     Psychiatric problem        Past Surgical History:        Procedure Laterality Date    CATARACT REMOVAL  CHOLECYSTECTOMY      FRACTURE SURGERY      HYSTERECTOMY      JOINT REPLACEMENT      ORIF FEMUR DECOMPRESSION Left 12/02/2013       Social History:    Social History     Tobacco Use    Smoking status: Former Smoker    Smokeless tobacco: Never Used   Substance Use Topics    Alcohol use: No                                Counseling given: Not Answered      Vital Signs (Current):   Vitals:    04/03/22 1958 04/04/22 0303 04/04/22 0316 04/04/22 0455   BP: (!) 111/90 100/80     Pulse:  112 107 107   Resp:  19 17 16   Temp: 36.6 °C (97.9 °F) 36.3 °C (97.4 °F)     TempSrc: Oral Oral     SpO2:  100% 100%    Weight:  107 lb 14.4 oz (48.9 kg)     Height:                                                  BP Readings from Last 3 Encounters:   04/04/22 100/80   08/22/21 (!) 132/101   10/13/19 124/69       NPO Status:                                                                                 BMI:   Wt Readings from Last 3 Encounters:   04/04/22 107 lb 14.4 oz (48.9 kg)   08/22/21 124 lb (56.2 kg)   10/13/19 122 lb (55.3 kg)     Body mass index is 19.74 kg/m². CBC:   Lab Results   Component Value Date    WBC 7.6 04/03/2022    RBC 3.36 04/03/2022    HGB 10.1 04/03/2022    HCT 31.7 04/03/2022    MCV 94.3 04/03/2022    RDW 14.6 04/03/2022     04/03/2022       CMP:   Lab Results   Component Value Date     04/03/2022    K 4.8 04/03/2022     04/03/2022    CO2 24 04/03/2022    BUN 20 04/03/2022    CREATININE 0.9 04/03/2022    GFRAA >60 04/03/2022    LABGLOM 58 04/03/2022    GLUCOSE 91 04/03/2022    PROT 5.8 04/03/2022    PROT 6.4 12/08/2011    CALCIUM 8.4 04/03/2022    BILITOT 1.7 04/03/2022    ALKPHOS 127 04/03/2022    AST 32 04/03/2022    ALT 11 04/03/2022       POC Tests: No results for input(s): POCGLU, POCNA, POCK, POCCL, POCBUN, POCHEMO, POCHCT in the last 72 hours.     Coags:   Lab Results   Component Value Date    PROTIME 18.7 10/13/2019    PROTIME 11.8 12/08/2011    INR 1.63 10/13/2019    APTT 32.5 10/13/2019       HCG (If Applicable): No results found for: PREGTESTUR, PREGSERUM, HCG, HCGQUANT     ABGs: No results found for: PHART, PO2ART, SCW8VLX, QLH6MIF, BEART, C4VWDWGN     Type & Screen (If Applicable):  No results found for: LABABO, LABRH    Drug/Infectious Status (If Applicable):  No results found for: HIV, HEPCAB    COVID-19 Screening (If Applicable): No results found for: COVID19        Anesthesia Evaluation  Patient summary reviewed  Airway:         Dental:          Pulmonary:   (+) shortness of breath:                             Cardiovascular:    (+) hypertension:, valvular problems/murmurs: MVP, CAD:, CHF:,       ECG reviewed      Echocardiogram reviewed               ROS comment:    Atrial fibrillation with rapid ventricular response   Possible Anterior infarct (cited on or before 14-MAR-2019)   Abnormal ECG   When compared with ECG of 22-AUG-2021 12:48,   premature supraventricular complexes are no longer present   Questionable change in initial forces of Septal leads        Summary   Left ventricular function is low normal , EF is estimated at 45-50%. Mild concentric left ventricular hypertrophy. Mildly dilated left atrium. Right ventricular systolic pressure of 49 mm Hg suggestive of mild pulmonary   hypertension. Sclerotic aortic valve with moderate stenosis. peak velocity 3.34 m/sec-mean PG is 26mm Hg   Mild to moderate aortic insufficiency with PHT of 426 msec. Mild to moderate mitral regurgitation is present. Mild tricuspid regurgitation. No evidence of any pericardial effusion.       Signature      ------------------------------------------------------------------   Electronically signed by Kristi Magaña MD (Interpreting   physician) on 11/20/2018 at 12:47 PM   ------------------------------------------------------------------     Neuro/Psych:   (+) psychiatric history:            GI/Hepatic/Renal:   (+) GERD:, renal disease: CRI,           Endo/Other:    (+) blood dyscrasia: anemia, arthritis: OA., .                 Abdominal:             Vascular: Other Findings:           Anesthesia Plan      general     ASA 3     (Chart review only 4/4/22)  Induction: intravenous.                         Kim Block, APRN - CRNA   4/4/2022

## 2022-04-04 NOTE — OP NOTE
Operative Note      Patient: Triston Dudley  YOB: 1925  MRN: [id number]    Date of Procedure: 4/4/2022    Pre-Op Diagnosis: right hip fracture    Post-Op Diagnosis: Same       Procedure(s):  RIGHT FEMUR IM NAIL MATT INSERTION    Surgeon(s):  Kaitlin Anderson MD    Assistant:   Physician Assistant: Earl Nash PA-C    Anesthesia: General    Estimated Blood Loss (mL): 555RU    Complications: None    Specimens:   * No specimens in log *    Implants:  Implant Name Type Inv. Item Serial No.  Lot No. LRB No. Used Action   NAIL IM L360MM [id number] 130DEG LNG R PROX FEM GRN TI [employer] - [id number]  NAIL IM L360MM [id number] 130DEG LNG R PROX FEM GRN TI [employer]WD [id number] Right 1 Implanted   BLADE IM L95MM DIA10.35MM PROX FEM G TI [employer] FOR - [id number]  BLADE IM L95MM DIA10.35MM PROX FEM G TI [employer]LEY [employer] FOR  [employer] [place]-WD [id number] Right 1 Implanted   SCREW BNE L44MM DIA5MM COR DIA4. 3MM TIB LT GRN TI [address] - [id number]  SCREW BNE L44MM DIA5MM COR DIA4. 3MM TIB LT GRN TI [address]Y ST  [id number] [place]-WD [id number] Right 1 Implanted         Drains:   Urethral Catheter Straight-tip 16 fr (Active)   $ Urethral catheter insertion $ Not inserted for procedure 04/04/22 0045   Catheter Indications Urinary retention (acute or chronic), continuous bladder irrigation or bladder outlet obstruction 04/04/22 0049   Site Assessment Urethral drainage 04/04/22 0049   Urine Color Jeniffer 04/04/22 0049   Urine Appearance Clear 04/04/22 0049   Output (mL) 475 mL 04/04/22 0049       [REMOVED] External Urinary Catheter (Removed)   Catheter changed  Yes 04/04/22 0028   Placement Initiated 04/03/22 1938       Findings: see dictation    Detailed Description of Procedure:       Triston Dudley (2/18/1925)    4/4/2022    Intertrochanteric Hip Fracture, Intramedullary Fixation    Date of Surgery- 4/4/2022    Preoperative Diagnosis-   right Intertrochanteric Hip Fracture    Postoperative Diagnosis-  right Intertrochanteric Hip Fracture    Procedure-  1. Closed Reduction, Intramedullary nail  right hip ([id number])                 Surgeon- Dipesh Wiley MD    Assistant(s)-  Marlee Chun PA-C    Anesthesia- General    EBL- 150ml     Surgical Indications  The patient presented to the emergency room and was diagnosed with an Intertrochanteric hip fracture. The patient was admitted to the hospital and received medical clearance. The patient chose to proceed with closed reduction as needed with intramedullary nailing. Risks, benefits, expected outcomes and potential complications were discussed. At no time were any guarantees implied or stated. The patient electively signed the consent form. Patient Positioning and Surgical Prep  The patient was seen in the holding area and the appropriate extremity marked with an indelible pen. The patient was taken to the operative suite, identified and while on the hospital bed, spinal anesthesia was administered. The patient was transferred to the fracture table. The affected leg was placed in boot traction after the foot was well padded. The unaffected leg was gently abducted and externally rotated. The fracture was well visualized using biplanar fluoroscopy and the fracture reduced or manipulated as required. The lower extremity was prepped from the flank to knee with Duraprep and then draped in the normal sterile fashion. Exposure  An incision was made proximal to the greater trochanter. The fascia and muscle were split in line with their fibers. Using biplanar, c-arm fluoroscopy. A starting hole was identified at the tip of the greater trochanter. A guide wire was inserted and a 17mm cannulated reamer introduced to open the proximal femur. The long reaming guide wire was inserted into the medullary canal and the proximal femur reamed as necessary.     Insertion of Long Trochanteric Femoral Nail  The long trochanteric femoral nail was attached to the insertion handle and inserted over the reaming guide wire. The guide wire was removed. An incision was made along the lateral aspect of the thigh and through the fascia. The blade guide sleeve was inserted and snapped into the aiming guide. The sleeve assembly was advanced to the lateral femur. A 3.2 mm guide wire was drilled into the femoral head and the length for the helical blade was measured. The 11.0 cannulated drill was used to ream out the lateral cortex. The helical blade was assembled to the  and impacted into place. Using the flexible screwdriver, the preassembled locking mechanism was engaged by advancing the screw. The  and aiming guide were disengaged. Final fluoroscopic views were obtained. Closure and Disposition  The wounds were copiously irrigated and closed in layers. Sterile dressings were applied. All sponge and needle counts were correct. The patient was awakened and taken to the postoperative area in stable condition. I spoke with the patients family in the consultation room postoperatively. ADDENDUM:  Biplanar fluoroscopy was used throughout the case to confirm satisfactory fracture reduction and placement of all hardware. HARISH Shah was involved in the integral portions of  hip fracture reduction, hardware implantation, wound closure and transportation to the PACU.       Electronically signed by Sera Beck MD on 4/4/2022 at 4:26 PM

## 2022-04-04 NOTE — PROGRESS NOTES
INTERNAL MEDICINE PROGRESS NOTE        Triston Dudley   2/18/1925   Primary Care Physician:  Joelle Childress MD  Admit Date: 4/3/2022     Subjective:   Patient awake, complains of hip pain. HR elevated around 110. Objective:   /80   Pulse 107   Temp 97.4 °F (36.3 °C) (Oral)   Resp 16   Ht 5' 2\" (1.575 m)   Wt 107 lb 14.4 oz (48.9 kg)   SpO2 100%   BMI 19.74 kg/m²    General appearance: alert, appears stated age and mild distress  Head: Normocephalic, without obvious abnormality, atraumatic  Neck: no adenopathy and supple, symmetrical, trachea midline  Lungs: clear to auscultation bilaterally  Heart: regular rate and rhythm and S1, S2 normal  Abdomen: soft, non-tender; bowel sounds normal  Extremities: no clubbing, cyanosis or edema. Right hip tenderness.    Neurologic: No new focal deficits    Data Review  Lab Results   Component Value Date     04/03/2022    K 4.8 04/03/2022     04/03/2022    CO2 24 04/03/2022    CREATININE 0.9 04/03/2022    BUN 20 04/03/2022    CALCIUM 8.4 04/03/2022     Lab Results   Component Value Date    WBC 7.6 04/03/2022    HGB 10.1 (L) 04/03/2022    HCT 31.7 (L) 04/03/2022    MCV 94.3 04/03/2022     (L) 04/03/2022     INR/Prothrombin Time      Meds:    escitalopram  20 mg Oral Daily    pantoprazole  40 mg Oral QAM AC    QUEtiapine  25 mg Oral BID    venlafaxine  37.5 mg Oral TID    sodium chloride flush  5-40 mL IntraVENous 2 times per day     PRN Meds: docusate sodium, sodium chloride flush, sodium chloride, ondansetron **OR** ondansetron, polyethylene glycol, acetaminophen **OR** acetaminophen, HYDROmorphone, HYDROcodone-acetaminophen    Assessment/Plan:   Patient Active Hospital Problem List:  Patient Active Problem List   Diagnosis    Hypertension    Intermittent atrial fibrillation (HCC)    Anemia    Nausea without vomiting    Acute diastolic (congestive) heart failure (HCC)    Dyspnea    Closed right hip fracture, initial encounter [facility])     Assessment:  Right hip pain: 2/2 below. Continue as needed analgesics. Right hip IT fracture: For trochanteric nail fixation per Ortho. Will eventually need PT/OT/CM consults for possible placement. A. fib RVR: Continue rate control. Off anticoagulation due to recent falls. Anxiety/depression: On Seroquel and Effexor. GERD: On PPI. Elevated CK: In setting of fall, monitor. Plan: Will have trochanteric nail fixation per Ortho. Continue optimal pain control. PT/OT to evaluate eventually. Monitor labs and patient condition. Electronically signed by Erica Clark PA-C on 4/4/2022 at 8:03 AM   I have independently evaluated and examined this patient today. I have reviewed radiologic and biochemical tests on this patient. Management Plan is developed mutually with KIMANI Funes. I have reviewed above note and agree with assessment and plan.

## 2022-04-04 NOTE — ANESTHESIA PRE PROCEDURE
Department of Anesthesiology  Preprocedure Note       Name:  Amee Knutson   Age:  80 y.o.  :  1925                                          MRN:  [id number]         Date:  2022      Surgeon: Pilar Torres):  Mejia Gandara MD    Procedure: Procedure(s):  RIGHT FEMUR IM NAIL MATT INSERTION    Medications prior to admission:   Prior to Admission medications    Medication Sig Start Date End Date Taking? Authorizing Provider   propranolol (INDERAL LA) 80 MG extended release capsule Take 80 mg by mouth daily HOLD IF SBP BELOW 110   Yes Historical Provider, MD   meclizine (ANTIVERT) 12.5 MG tablet Take 12.5 mg by mouth every 6 hours as needed for Dizziness   Yes Historical Provider, MD   ipratropium (ATROVENT) 0.03 % nasal spray 1 spray by Each Nostril route 3 times daily as needed for Rhinitis   Yes Historical Provider, MD   escitalopram (LEXAPRO) 20 MG tablet Take 20 mg by mouth daily   Yes Historical Provider, MD   mirabegron (MYRBETRIQ) 25 MG TB24 Take 25 mg by mouth daily   Yes Historical Provider, MD   QUEtiapine (SEROQUEL) 25 MG tablet Take 25 mg by mouth at bedtime    Yes Historical Provider, MD   venlafaxine (EFFEXOR) 37.5 MG tablet Take 37.5 mg by mouth daily    Yes Historical Provider, MD   furosemide (LASIX) 20 MG tablet Take 1 tablet by mouth 2 times daily Pt has this medications at home.   Patient taking differently: Take 20 mg by mouth three times a week Monday,Wednesday,and 18  Yes Dean MD Beth   docusate sodium (COLACE) 100 MG capsule Take 100 mg by mouth 2 times daily as needed for Constipation   Yes Historical Provider, MD   omeprazole (PRILOSEC) 20 MG delayed release capsule Take 20 mg by mouth daily    Yes Historical Provider, MD   lorazepam (ATIVAN) 0.5 MG tablet Take 0.5 mg by mouth 2 times daily as needed     Historical Provider, MD       Current medications:    Current Facility-Administered Medications   Medication Dose Route Frequency Provider Last Rate Last Admin    metoprolol (LOPRESSOR) 5 mg in sodium chloride 0.9 % 50 mL IVPB  5 mg IntraVENous Q6H PRN Alberto Claudean Ancona,  mL/hr at 04/04/22 1127 5 mg at 04/04/22 1127    ceFAZolin (ANCEF) 2000 mg in dextrose 4 % 100 mL IVPB (premix)  2,000 mg IntraVENous 30 Min Pre-Op Laury Mckinnon MD   2,000 mg at 04/04/22 1537    docusate sodium (COLACE) capsule 100 mg  100 mg Oral BID PRN Jt Triana MD        escitalopram (LEXAPRO) tablet 20 mg  20 mg Oral Daily Jt Triana MD   20 mg at 04/03/22 1759    pantoprazole (PROTONIX) tablet 40 mg  40 mg Oral QAM AC Jt Triana MD   40 mg at 04/04/22 0542    QUEtiapine (SEROQUEL) tablet 25 mg  25 mg Oral BID Jt Triana MD   25 mg at 04/03/22 2004    venlafaxine [facility]) tablet 37.5 mg  37.5 mg Oral TID Jt Triana MD   37.5 mg at 04/03/22 2004    0.9 % sodium chloride infusion   IntraVENous Continuous Partfrancoise Mckinnon MD 75 mL/hr at 04/04/22 0722 New Bag at 04/04/22 0722    sodium chloride flush 0.9 % injection 5-40 mL  5-40 mL IntraVENous 2 times per day Jt Triana MD        sodium chloride flush 0.9 % injection 5-40 mL  5-40 mL IntraVENous PRN Jt Triana MD   10 mL at 04/04/22 0442    0.9 % sodium chloride infusion   IntraVENous PRN Jt Triana MD        ondansetron (ZOFRAN-ODT) disintegrating tablet 4 mg  4 mg Oral Q8H PRN Jt Triana MD        Or    ondansetron [facility]) injection 4 mg  4 mg IntraVENous Q6H PRN Jt Triana MD   4 mg at 04/04/22 0438    polyethylene glycol (GLYCOLAX) packet 17 g  17 g Oral Daily PRN Jt Triana MD        acetaminophen (TYLENOL) tablet 650 mg  650 mg Oral Q6H PRN Jt Triana MD        Or    acetaminophen (TYLENOL) suppository 650 mg  650 mg Rectal Q6H PRN Jt Triana MD        HYDROmorphone (DILAUDID) injection 0.5 mg  0.5 mg IntraVENous Q4H PRN Jt Triana MD   0.5 mg at 04/04/22 0442    HYDROcodone-acetaminophen (NORCO) 5-325 MG per tablet 1 tablet  1 tablet Oral Q6H PRN Marion Castillo Danae Garvin MD   1 tablet at 04/03/22 2134       Allergies: Allergies   Allergen Reactions    Biaxin [Clarithromycin]     Codeine Rash       Problem List:    Patient Active Problem List   Diagnosis Code    Hypertension I10    Intermittent atrial fibrillation (HCC) I48.0    Anemia D64.9    Nausea without vomiting R11.0    Acute diastolic (congestive) heart failure (HCC) I50.31    Dyspnea R06.00    [facility] S72.001A       Past Medical History:        Diagnosis Date    Arthritis     [facility]     CAD (coronary artery disease)     Chronic kidney disease     GERD (gastroesophageal reflux disease)     Hypertension     Mitral valve prolapse     Psychiatric problem        Past Surgical History:        Procedure Laterality Date    CATARACT REMOVAL      CHOLECYSTECTOMY      FRACTURE SURGERY      HYSTERECTOMY      JOINT REPLACEMENT      ORIF FEMUR DECOMPRESSION Left 12/02/2013       Social History:    Social History     Tobacco Use    Smoking status: Former Smoker    Smokeless tobacco: Never Used   Substance Use Topics    Alcohol use:  No                                Counseling given: Not Answered      Vital Signs (Current):   Vitals:    04/04/22 1045 04/04/22 1128 04/04/22 1209 04/04/22 1240   BP:  (!) 140/86  (!) 112/48   Pulse:  119  109   Resp:  23  19   Temp:       TempSrc:       SpO2: 100% 100%  100%   Weight:    107 lb (48.5 kg)   Height:   5' 2\" (1.575 m) 5' 2\" (1.575 m)                                              BP Readings from Last 3 Encounters:   04/04/22 (!) 112/48   04/04/22 (!) 85/36   08/22/21 (!) 132/101       NPO Status: Time of last liquid consumption: 0001                        Time of last solid consumption: 0001                        Date of last liquid consumption: 04/03/22                        Date of last solid food consumption: 04/03/20    BMI:   Wt Readings from Last 3 Encounters:   04/04/22 107 lb (48.5 kg) 08/22/21 124 lb (56.2 kg)   10/13/19 122 lb (55.3 kg)     Body mass index is 19.57 kg/m². CBC:   Lab Results   Component Value Date    WBC 14.7 04/04/2022    RBC 2.99 04/04/2022    HGB 8.9 04/04/2022    HCT 29.2 04/04/2022    MCV 97.7 04/04/2022    RDW 14.5 04/04/2022     04/04/2022       CMP:   Lab Results   Component Value Date     04/04/2022    K 5.0 04/04/2022     04/04/2022    CO2 23 04/04/2022    BUN 32 04/04/2022    CREATININE 1.0 04/04/2022    GFRAA >60 04/04/2022    LABGLOM 51 04/04/2022    GLUCOSE 148 04/04/2022    PROT 5.8 04/03/2022    PROT 6.4 12/08/2011    CALCIUM 8.1 04/04/2022    BILITOT 1.7 04/03/2022    ALKPHOS 127 04/03/2022    AST 32 04/03/2022    ALT 11 04/03/2022       POC Tests: No results for input(s): POCGLU, POCNA, POCK, POCCL, POCBUN, POCHEMO, POCHCT in the last 72 hours. Coags:   Lab Results   Component Value Date    PROTIME 18.7 10/13/2019    PROTIME 11.8 12/08/2011    INR 1.63 10/13/2019    APTT 32.5 10/13/2019       HCG (If Applicable): No results found for: PREGTESTUR, PREGSERUM, HCG, HCGQUANT     ABGs: No results found for: PHART, PO2ART, TMP3IOX, WBH6MUR, BEART, K0PJTVJZ     Type & Screen (If Applicable):  No results found for: LABABO, LABRH    Drug/Infectious Status (If Applicable):  No results found for: HIV, HEPCAB    COVID-19 Screening (If Applicable): No results found for: COVID19        Anesthesia Evaluation    Airway: Mallampati: III        Dental:          Pulmonary:                              Cardiovascular:                      Neuro/Psych:               GI/Hepatic/Renal:             Endo/Other:                     Abdominal:             Vascular:           Other Findings:             Anesthesia Plan        ALAINA Schreiber - DEV   4/4/2022

## 2022-04-04 NOTE — PROGRESS NOTES
Physician Progress Note      PATIENT:               Portia Grimes  CSN #:                  [id number]  :                       1925  ADMIT DATE:       4/3/2022 1:52 PM  DISCH DATE:  RESPONDING  PROVIDER #:        Kar JORDAN PA-C          QUERY TEXT:    Pt admitted with right hip intertrochanteric fracture. Pt noted to have CPK   398. If possible, please document in progress notes and discharge summary if   you are evaluating and/or treating any of the following: The medical record reflects the following:  Risk Factors: fall, age  Clinical Indicators: Found down at home s/p fall. Treatment: IVF, planning PT/OT s/p surgery    Leopold Allis, BSN, RN  Clinical   [phone]  Options provided:  -- Traumatic rhabdomyolysis  -- Nontraumatic rhabdomyolysis  -- Rhabdomyolysis ruled out  -- Other - I will add my own diagnosis  -- Disagree - Not applicable / Not valid  -- Disagree - Clinically unable to determine / Unknown  -- Refer to Clinical Documentation Reviewer    PROVIDER RESPONSE TEXT:    This patient has traumatic rhabdomyolysis.     Query created by: Lucero Ovalle on 2022 12:09 PM      Electronically signed by:  Mariza Frye PA-C 2022 4:09 PM

## 2022-04-04 NOTE — PLAN OF CARE
Nutrition Problem #1: Inadequate oral intake  Intervention: Food and/or Nutrient Delivery: Continue NPO  Nutritional Goals: Pt will tolerate diet advancement within 24-48 hrs

## 2022-04-05 NOTE — PROGRESS NOTES
4/4/22 @ 2130, critical lab: lactic 3.2, Dr. Riya Hua  contacted via phone. New order to redraw lactic acid in AM.   4/5/22 @ 0330, critical lab: potassium 5.6, Anant contacted via phone, no answer, message left to return call to this RN. [id number] Dr. Riya Hua contacted via phone, patient lactic 2.5, potassium 5.6, HR jumping up to the 150's. Verbal order received to increase IV maintenance fluids from 75ml/hr to 100ml/hr, no other orders given at this time. Patient appears to be resting comfortably in bed at this time. Will continue to monitor.

## 2022-04-05 NOTE — CONSULTS
Consult completed. Procedure/rationale explained to family @ bedside & consent obtained. #20ga Arrow Endurance Extended Dwell MidLine Catheter initiated to RUE Basilic Vein using sterile, UltraSound-guided technique without difficulty/complications. Positioning verified via UltraSound visualization of catheter within vessel lumen; site returns blood briskly and flushes without resistance/abnormalities. Sterile dressing with SkinPrep, StatLock Securing Device, BioPatch, SwabCap, and Limb Precautions band applied. Pt tolerated well & no other c/o or needs noted or reported. [id number], Primary RN notified.

## 2022-04-05 NOTE — CONSULTS
Nephrology Service Consultation    Patient:  Mavis Modi  MRN: [id number]  Consulting physician:  Ronaldo Nathan, *  Reason for Consult: Acute renal failure with hyperkalemia    History Obtained From:  electronic medical record  PCP: Ronaldo Nathan MD    HISTORY OF PRESENT ILLNESS:   The patient is a 80 y.o. female who presents with weakness status post fall came from home 2 days prior. Having significant pain after the fall noted to have hip fracture and had emergency surgery done yesterday with Ortho. Afterwards probably related to combination of factors patient is more confused lethargic and tachycardic with underlying atrial fibrillation as well. Seen this morning with acute renal failure with hyperkalemia. Noted to have lactic acidosis as well. Patient moved to higher level of care and discussed with cardiology as well as Dr. Fredo Lara. Started on IV fluids and hydration in the above setting try to treat potassium medically as able and renal to monitor and above setting with above. Patient known history of atrial fibrillation coronary disease chronic kidney disease acid reflux hypertension. Underlying psychiatric issues as well and medications. 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 We will see how contrast affects patient as well as risk of other options of treatment as well as needing anticoagulation.   Patient is currently limited code    Past Medical History:        Diagnosis Date    Arthritis     Atrial fibrillation ([address]     CAD (coronary artery disease)     Chronic kidney disease     GERD (gastroesophageal reflux disease)     Hypertension     Mitral valve prolapse     Psychiatric problem        Past Surgical History:        Procedure Laterality Date    CATARACT REMOVAL      CHOLECYSTECTOMY      FEMUR FRACTURE SURGERY Right 4/4/2022    RIGHT FEMUR IM NAIL MATT INSERTION performed by Sera Beck MD at [address] [facility]      JOINT REPLACEMENT      ORIF FEMUR DECOMPRESSION Left 12/02/2013       Medications:   Scheduled Meds:   sodium zirconium cyclosilicate  10 g Oral TID    ceFAZolin  2,000 mg IntraVENous 30 Min Pre-Op    enoxaparin  30 mg SubCUTAneous Daily    ceFAZolin (ANCEF) 1000 mg in dextrose 5 % 50 mL IVPB (mini-bag)  1,000 mg IntraVENous Q8H    escitalopram  20 mg Oral Daily    pantoprazole  40 mg Oral QAM AC    QUEtiapine  25 mg Oral BID    venlafaxine  37.5 mg Oral TID    sodium chloride flush  5-40 mL IntraVENous 2 times per day     Continuous Infusions:   IV infusion builder 100 mL/hr at 04/05/22 0844    dilTIAZem 5 mg/hr (04/05/22 0902)    sodium chloride       PRN Meds:.fleet, metoprolol (LOPRESSOR) ivpb, LORazepam, docusate sodium, sodium chloride flush, sodium chloride, ondansetron **OR** ondansetron, polyethylene glycol, acetaminophen **OR** acetaminophen, HYDROmorphone, HYDROcodone-acetaminophen    Allergies:  Biaxin [clarithromycin] and Codeine    Social History:   TOBACCO:   reports that she has quit smoking. She has never used smokeless tobacco.  ETOH:   reports no history of alcohol use. OCCUPATION:      Family History:       Problem Relation Age of Onset    Heart Disease Mother     Heart Disease Father     Heart Disease Sister     Heart Disease Brother        REVIEW OF SYSTEMS:  Negative except for weak status post surgery somewhat confused. Physical Exam:    I/O: 04/04 0701 - 04/05 0700  In: 555 [I.V.:455]  Out: 680 [Urine:630]    Vitals: BP (!) 89/47   Pulse 112   Temp 96.6 °F (35.9 °C) (Axillary)   Resp 21   Ht 5' 2\" (1.575 m)   Wt 107 lb (48.5 kg)   SpO2 (!) 76%   BMI 19.57 kg/m²   General appearance: Weak arousable  HEENT: Head: Normal, normocephalic, atraumatic.   Neck: supple, symmetrical, trachea midline  Lungs: diminished breath sounds bilaterally  Heart: S1, S2 normal  Abdomen: abnormal findings:  soft NT  Extremities: edema trace status post surgery  Neurologic: Mental status: alertness: Weak sleepy      CBC:   Recent Labs     04/04/22  1159 04/04/22  2101 04/05/22  0157   WBC 14.7* 24.6* 24.6*   HGB 8.9* 8.1* 8.2*   * 105* 107*     BMP:    Recent Labs     04/03/22  1415 04/04/22  1159 04/05/22  0157    138 141   K 4.8 5.0 5.6*    104 110   CO2 24 23 19*   BUN 20 32* 30*   CREATININE 0.9 1.0 1.1   GLUCOSE 91 148* 119*     Hepatic:   Recent Labs     04/03/22  1415 04/05/22  0157   AST 32 50*   ALT 11 19   BILITOT 1.7* 1.3*   ALKPHOS 127 121     Troponin: No results for input(s): TROPONINI in the last 72 hours. BNP: No results for input(s): BNP in the last 72 hours. Lipids: No results for input(s): CHOL, HDL in the last 72 hours.     Invalid input(s): LDLCALCU  ABGs: No results found for: PHART, PO2ART, SBN6LQF  INR:   Recent Labs     04/05/22  0953   INR 1.13     Renal Labs  Albumin:    Lab Results   Component Value Date    LABALBU 3.4 04/05/2022     Calcium:    Lab Results   Component Value Date    CALCIUM 8.0 04/05/2022     Phosphorus:    Lab Results   Component Value Date    PHOS 3.7 09/09/2014     U/A:    Lab Results   Component Value Date    NITRU NEGATIVE 04/04/2022    NITRU NEGATIVE 12/08/2011    COLORU YELLOW 04/04/2022    WBCUA <1 04/04/2022    RBCUA 1 04/04/2022    MUCUS RARE 04/04/2022    BACTERIA NEGATIVE 04/04/2022    CLARITYU CLEAR 04/04/2022    SPECGRAV >1.030 04/04/2022    UROBILINOGEN 4.0 04/04/2022    BILIRUBINUR SMALL NUMBER OR AMOUNT OBSERVED 04/04/2022    BLOODU NEGATIVE 04/04/2022    GLUCOSEU NEGATIVE 12/08/2011    KETUA TRACE 04/04/2022     ABG:  No results found for: PHART, GEC9VCA, PO2ART, NTJ3SJW, BEART, THGBART, KAG2EBL, Z6RNDAHB  HgBA1c:  No results found for: LABA1C  Microalbumen/Creatinine ratio:  No components found for: RUCREAT  TSH:  No results found for: TSH  IRON:  No results found for: IRON  Iron Saturation:  No components found for: PERCENTFE  TIBC:  No results found for: TIBC  FERRITIN:  No results found for: FERRITIN  RPR:  No results found for: RPR  ABRAHAM:  No results found for: ANATITER, ABRAHAM  24 Hour Urine for Creatinine Clearance:  No components found for: CREAT4, UHRS10, UTV10  -----------------------------------------------------------------      Assessment and Recommendations     Patient Active Problem List   Diagnosis Code    Hypertension I10    Intermittent atrial fibrillation (HCC) I48.0    Anemia D64.9    Nausea without vomiting R11.0    Acute diastolic (congestive) heart failure (HCC) I50.31    Dyspnea R06.00    [facility] S72.001A     Impression plan  #1 status post fall with hip fracture right  #2 A. fib rapid rate with low blood pressure  #3 lactic acidosis  #4 metabolic acidosis with hyperkalemia  #5 acute renal failure status post contrast and low blood pressure  #6 metabolic encephalopathy    Plan  #1 postop day #1 from surgery monitor with Ortho  #2 moved to a higher level of care start on Cardizem drip and give IV dig today follow-up cardio recs the setting of valvular heart disease as well as anemia  #3 monitor acidosis work-up for possible infectious etiology as well  #4 start bicarb drip try to give [place] Luisa follow-up repeat potassium  #5 status post contrast and low blood pressure give gentle IV fluids and monitor renal function  #6 unsure of true baseline mental status will monitor for now patient limited code  Renal to follow no acute dialysis needs yet we will follow closely  Electronically signed by Deon Padilla MD on 4/5/2022 at 10:20 AM

## 2022-04-05 NOTE — PLAN OF CARE
Problem: Pain:  Goal: Pain level will decrease  Description: Pain level will decrease  Outcome: Ongoing  Goal: Control of acute pain  Description: Control of acute pain  Outcome: Ongoing  Goal: Control of chronic pain  Description: Control of chronic pain  Outcome: Ongoing     Problem: Nutrition  Goal: Optimal nutrition therapy  Outcome: Ongoing     Problem: Falls - Risk of:  Goal: Will remain free from falls  Description: Will remain free from falls  Outcome: Ongoing  Goal: Absence of physical injury  Description: Absence of physical injury  Outcome: Ongoing

## 2022-04-05 NOTE — PROGRESS NOTES
INTERNAL MEDICINE PROGRESS NOTE        El Bhardwaj   2/18/1925   Primary Care Physician:  Ovi Saucedo MD  Admit Date: 4/3/2022     Subjective:   Patient is confused. Last night her heart rate went as high as 150's. She was agitated yesterday evening and received ativan for that. She is confused this am. Her blood pressure is on low side. She does not have any documented vomiting, diarrhhea or fever. Objective:   /87   Pulse 62   Temp 96.6 °F (35.9 °C) (Axillary)   Resp 20   Ht 5' 2\" (1.575 m)   Wt 107 lb (48.5 kg)   SpO2 92%   BMI 19.57 kg/m²    General appearance: alert, appears stated age and mild distress  Head: Normocephalic, without obvious abnormality, atraumatic  Neck: no adenopathy and supple, symmetrical, trachea midline  Lungs: clear to auscultation bilaterally  Heart: irregular rate and rhythm and S1, S2 normal, tachycardiac  Abdomen: soft, non-tender; bowel sounds normal  Extremities: no clubbing, cyanosis or edema. Right hip tenderness.    Neurologic: No new focal deficits    Data Review  Lab Results   Component Value Date     04/05/2022    K 5.6 (HH) 04/05/2022     04/05/2022    CO2 19 (L) 04/05/2022    CREATININE 1.1 04/05/2022    BUN 30 (H) 04/05/2022    CALCIUM 8.0 (L) 04/05/2022     Lab Results   Component Value Date    WBC 24.6 (H) 04/05/2022    HGB 8.2 (L) 04/05/2022    HCT 28.0 (L) 04/05/2022    .4 (H) 04/05/2022     (L) 04/05/2022     INR/Prothrombin Time      Meds:    sodium zirconium cyclosilicate  10 g Oral TID    digoxin  125 mcg IntraVENous Once    ceFAZolin  2,000 mg IntraVENous 30 Min Pre-Op    enoxaparin  30 mg SubCUTAneous Daily    ceFAZolin (ANCEF) 1000 mg in dextrose 5 % 50 mL IVPB (mini-bag)  1,000 mg IntraVENous Q8H    escitalopram  20 mg Oral Daily    pantoprazole  40 mg Oral QAM AC    QUEtiapine  25 mg Oral BID    venlafaxine  37.5 mg Oral TID    sodium chloride flush  5-40 mL IntraVENous 2 times per day PRN Meds: fleet, metoprolol (LOPRESSOR) ivpb, LORazepam, docusate sodium, sodium chloride flush, sodium chloride, ondansetron **OR** ondansetron, polyethylene glycol, acetaminophen **OR** acetaminophen, HYDROmorphone, HYDROcodone-acetaminophen    Assessment/Plan:   Patient Active Hospital Problem List:  Patient Active Problem List   Diagnosis    Hypertension    Intermittent atrial fibrillation (HCC)    Anemia    Nausea without vomiting    Acute diastolic (congestive) heart failure (HCC)    Dyspnea    Closed right hip fracture, initial encounter ([facility] [address]     Assessment:  Hypotension: gave iv fluid. Concern for congestive heart failure  Right hip pain: 2/2 below. Continue as needed analgesics. Right hip IT fracture: For trochanteric nail fixation per Ortho. Will eventually need PT/OT/CM consults for possible placement. A. fib RVR: Cardiology consulted, Will get Digoxin also  Anxiety/depression: On Seroquel and Effexor. GERD: On PPI. Elevated CK: In setting of fall, monitor. Plan:  S/p surgery  Condition worsened. Discussed with patient's family about the overall quality and long term prognosis  Remains full code for now  Nephrology and cardiology consulted. Monitor condition closely.      Electronically signed by Dean Campos MD on 4/5/2022 at 7:54 AM

## 2022-04-05 NOTE — ANESTHESIA POSTPROCEDURE EVALUATION
Department of Anesthesiology  Postprocedure Note    Patient: Salvador Yun  MRN: [id number]  YOB: 1925  Date of evaluation: 4/4/2022  Time:  9:10 PM     Procedure Summary     Date: 04/04/22 Room / Location: [address]    Anesthesia Start: 9654 Anesthesia Stop: 1657    Procedure: RIGHT FEMUR IM NAIL MATT INSERTION (Right Hip) Diagnosis: (right hip pain)    Surgeons: Devan Andrews MD Responsible Provider: Claudia Cohen MD    Anesthesia Type: general ASA Status: 3          Anesthesia Type: general    Lesly Phase I: Lesly Score: 8    Lesly Phase II:      Last vitals: Reviewed and per EMR flowsheets.        Anesthesia Post Evaluation    Patient location during evaluation: PACU  Patient participation: complete - patient participated  Level of consciousness: awake  Pain score: 3  Airway patency: patent  Nausea & Vomiting: no vomiting and no nausea  Complications: no  Cardiovascular status: blood pressure returned to baseline and hemodynamically stable  Respiratory status: acceptable  Hydration status: stable

## 2022-04-05 NOTE — CONSULTS
[facility]-[id number]  Patient is a 80year old female who presented to the ED on 04/03 with RLE pain after having a mechanical fall the day prior. XR Hip showed displaced angulated intertrochanteric fracture fracture of the right proximal femur. Patient underwent intramedullary nail aleksandra insertion of R femur on 04/04. Postoperatively, she had an elevated lactate (3.2), was hyperkalemic (5.6), and in Afib w/ RVR. Patient this AM was severely confused, unable to answer any questions during the extent of the interview. She was tachycardic into the 140's and in an irregular rhythm.      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  Should be noted that patient has macrocytic anemia with Hgb of 8.2  Echo ordered-last in 2020 mod-severe AS-noted sig murmur on exam- likely severe now-will check EF    Agree with above    Electronically signed by Ben Ying MD on 4/5/22 at 12:50 PM EDT

## 2022-04-05 NOTE — PROGRESS NOTES
Pt seen and examined risk arf post hip surgery, confused got pain meds. Now a fib rvr and consult to cardio and possible transfer higher level care if need cardizem gtt. Monitor affect. had ct head. Try give po lokelma for K and start bicarb gtt in above setting and pan culture with increase lactic acid. Will monitor and full note to follow. Ct with contrast abd order laxative and ? Plan work up mesenteric artery occulsion ? Baseline prior.

## 2022-04-05 NOTE — PROGRESS NOTES
Physician Progress Note      PATIENT:               Torrey Sanders  CSN #:                  [id number]  :                       1925  ADMIT DATE:       4/3/2022 1:52 PM  [facility] DATE:  RESPONDING  PROVIDER #:        Nacho Stokes MD          QUERY TEXT:    Pt admitted with right hip fracture. Pt noted to have agitation, increased   confusion. Per cardiology notes on , \"Patient this AM was severely   confused, unable to answer any questions during the extent of the interview. \"    If possible, please document in the progress notes and discharge summary if   you are evaluating and / or treating any of the following: The medical record reflects the following:  Risk Factors: age, dementia, dehydration  Clinical Indicators: DARRYL. Per nursing assessment, \"Disoriented to person,   place, time and situation, Delayed responses. \"  Treatment: bed alarm, fall precautions, IVF, IV Seroquel    MARCELLA Hancock, RN  Clinical   [phone]  Options provided:  -- Metabolic encephalopathy  -- AMS due to dementia  -- Other - I will add my own diagnosis  -- Disagree - Not applicable / Not valid  -- Disagree - Clinically unable to determine / Unknown  -- Refer to Clinical Documentation Reviewer    PROVIDER RESPONSE TEXT:    This patient has metabolic encephalopathy.     Query created by: Mariah Toure on 2022 2:32 PM      Electronically signed by:  Nacho Stokes MD 2022 4:01 PM

## 2022-04-05 NOTE — PROGRESS NOTES
Occupational Therapy    OT/PT attempted to see pt for initial evaluation. Held by LPN due to agitation and medical status. Will re-attempt tomorrow as appropriate.     Colleen Bonilla OTR/L, [place], .[phone]

## 2022-04-05 NOTE — PROGRESS NOTES
This nurse came onto the shift, pt was tachy and still confused and aggitated. Dr. Jade Mercedes was at the bedside and consulted cardiology to have pt transferred to a higher acuity. 3N. This nurse gave bedside report to [place] [address] and [facility], pt stable during transport.

## 2022-04-05 NOTE — PROGRESS NOTES
Patient unable to swallow PO medications at this time. Left voicemail for Dr. Ariadna Love to notify and have any PO orders changed to IV. Patient Potassium 5.7, Lokelma ordered but unable to give at this time. Dr. Delfin Dill also notified of this per voicemail. Joelle Shirley in room with patient.

## 2022-04-05 NOTE — CONSULTS
Patient is a 80year old female who presented to the ED on 04/03 with RLE pain after having a mechanical fall the day prior. XR Hip showed displaced angulated intertrochanteric fracture fracture of the right proximal femur. Patient underwent intramedullary nail aleksandra insertion of R femur on 04/04. Postoperatively, she had an elevated lactate (3.2), was hyperkalemic (5.6), and in Afib w/ RVR. Patient this AM was severely confused, unable to answer any questions during the extent of the interview. She was tachycardic into the 140's and in an irregular rhythm. 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  Should be noted that patient has macrocytic anemia with Hgb of 8.2  To discuss utility of echo given known hx of aortic stenosis   Hx of moderate aortic stenosis  Hx of chronic afib   Prognosis is guarded, discussed with daughter considering no chest compression  Keep on cardizem for now for rate control  Treat hyperkalemia and sepsis  She was on inderal at home and diuretics         Echo ---Summary-2018   Left ventricular function is low normal , EF is estimated at 45-50%. Mild concentric left ventricular hypertrophy. Mildly dilated left atrium. Right ventricular systolic pressure of 49 mm Hg suggestive of mild pulmonary   hypertension. Sclerotic aortic valve with moderate stenosis. peak velocity 3.34 m/sec-mean PG is 26mm Hg   Mild to moderate aortic insufficiency with PHT of 426 msec. Mild to moderate mitral regurgitation is present. Mild tricuspid regurgitation. No evidence of any pericardial effusion.     Electronically signed by Bobby Garcia MD on 4/5/22 at 9:45 AM EDT

## 2022-04-05 NOTE — CARE COORDINATION
.CM met with pt's son and daughter at bedside for d/c planning. Pt is resting in bed with eyes closed. Introduced self and updated white board. Pt's grandson lives with her. Pt was independetn prior to admission per family. Family provides her transportation. She has a PCP, has insurance, and is able to afford her medication. Pt has a walker and a cane. She is active with [facility]. Discussed the need for rehab d/t pt's hip fracture. They are agreeable to rehab. SNF list provided. They have requested [facility] as the first choice d/t pt went there in 2013 and [facility] is their #2 choice. Referral made to [facility] and to [facility] via confidential VM to follow d/t she may not be ARU appropriate. Will await the decision from ARU after PT/OT evals have been completed. PT/OT has been ordered & requested via WB.   TE

## 2022-04-06 NOTE — DISCHARGE SUMMARY
Mary Frances MD, Internal Medicine    [address] [facility]   [phone]   Patient ID  Fouiza Tello   1925  [id number]          Admit date: 4/3/2022   Discharge date: 2022      Admitting Physician: Lizzeth Stern MD   Discharge Physician: Talia Cherry PA-C     Discharge Diagnoses:   Cardiac arrest: code status limited. Patient BP dropped and became severely hypoxic despite medical therapy. Patient  at 12 on 22  Acute CHF 2/2 atrial fibrillation RVR: Given digoxin and amiodarone. Hypotension: gave IV fluids. Concern for congestive heart failure 2/2 atrial fibrillation. Right hip pain: 2/2 below. Continue as needed analgesics. Right hip IT fracture: s/p trochanteric nail fixation per Ortho. A. fib RVR: Cardiology consulted, given Digoxin. Anxiety/depression: On Seroquel and Effexor. GERD: On PPI. Elevated CK: In setting of fall, monitor. Patient Active Problem List   Diagnosis    Hypertension    Intermittent atrial fibrillation (HCC)    Anemia    Nausea without vomiting    Acute diastolic (congestive) heart failure (HCC)    Dyspnea    Closed right hip fracture, initial encounter [facility])       Discharged Condition:      Hospital Course: The patient was a 80 y.o. female who presented to ER with a fall. Patient found on the floor due to a mechanical fall. Patient had significant pain in the right lower extremity. Family had called Dr. Jaden Malik office and they are advised to take her to the emergency room. Further work-up revealed that she has right hip fracture. Patient was admitted. She had trochanteric nail fixation per Ortho. Patient went into afib RVR. She was given digoxin and amiodarone with some improvement in rate. Blood pressured dropped, she went into CHF and became hypoxic. Patient continued to decline and family to decided to change code status to limited - no intubation or CPR.  Patient continued to hemodynamically decline despite medical therapy. Patient passed away at [phone] on 4/06/22. Relevant Investigations  See HPI     Signed: Electronically signed by Mark Cabrera PA-C on 4/6/2022 at 9:58 AM  I have independently evaluated and examined this patient today. I have reviewed radiologic and biochemical tests on this patient. Management Plan is developed mutually with KIMANI Vizcaino. I have reviewed above note and agree with assessment and plan.    Time spent on discharge 35 minutes

## 2022-04-06 NOTE — PROGRESS NOTES
@6800 Charge RN called to notify this RN of HR=30  [id number] Upon entering patient's room, Telemety monitor showed no HR or RR.  @0610 No apical HR found, Patient w/o respirations  Charge RN came to bedside to verify cease in VS

## 2022-04-06 NOTE — PLAN OF CARE
Problem: Pain:  Goal: Pain level will decrease  Description: Pain level will decrease  4/6/2022 0056 by Jagdish Quintanilla RN  Outcome: Ongoing  4/5/2022 1353 by Kathy Paz LPN  Outcome: Ongoing  Goal: Control of acute pain  Description: Control of acute pain  4/6/2022 0056 by Jagdish Quintanilla RN  Outcome: Ongoing  4/5/2022 1353 by Kathy Paz LPN  Outcome: Ongoing  Goal: Control of chronic pain  Description: Control of chronic pain  4/6/2022 0056 by Jagdish Quintanilla RN  Outcome: Ongoing  4/5/2022 1353 by Kathy Paz LPN  Outcome: Ongoing     Problem: Nutrition  Goal: Optimal nutrition therapy  4/6/2022 0056 by Jagdish Quintanilla RN  Outcome: Ongoing  Note: Poor po intake  4/5/2022 1353 by Kathy Paz LPN  Outcome: Ongoing     Problem: Falls - Risk of:  Goal: Will remain free from falls  Description: Will remain free from falls  4/5/2022 1353 by Kathy Paz LPN  Outcome: Ongoing  Goal: Absence of physical injury  Description: Absence of physical injury  4/5/2022 1353 by Kathy Paz LPN  Outcome: Ongoing     Problem: Coping:  Goal: Ability to remain calm will improve  Description: Ability to remain calm will improve  Outcome: Ongoing  Note: Reassurance provided to Family     Problem: Mobility - Impaired:  Goal: Mobility will improve  Description: Mobility will improve  Outcome: Not Met This Shift  Note: Q2h turns     Problem: Self-Care:  Goal: Ability to participate in self-care as condition permits will improve  Description: Ability to participate in self-care as condition permits will improve  Outcome: Not Met This Shift

## 2022-04-06 NOTE — PROGRESS NOTES
HR<100, HR=90  Titrated cardizem gtt to 10cc/hr    HR=75-85, cardizem gtt  Titrated to 5mg/hr    @0135 Cardixem gtt stopped, HR=70-75  Amiodarone gtt still infusing, Rate decreased from 33.3 ml/hr changed to 16.7cc/hr

## 2022-04-06 NOTE — FLOWSHEET NOTE
04/06/22 0338   Vitals   Resp (!) 40   BP (!) 75/41   MAP (mmHg) (!) 52     Will contact On-Call Primary Service  To inform    Spoke w/ Dr. Roberta Stone, No new orders @ this time. Will con't to monitor.

## 2022-04-06 NOTE — PROGRESS NOTES
Assumed care @ 1900, Lanette Creed  Family @ bedside  Patient confused, Intermittently yelling out. Patient tachycardic, WX=[phone]. Cardizem gtt infusing @ 15mg/hr  Previous RN  Hanging Amiodarone gtt now, Started @ 33.3hr per order. All Family questions answered. Will con't to monitor.

## 2022-04-09 LAB
CULTURE: NORMAL
CULTURE: NORMAL
Lab: NORMAL
Lab: NORMAL
SPECIMEN: NORMAL
SPECIMEN: NORMAL

## 2023-07-07 NOTE — CONSULTS
[address] [place] [address], [facility]                                  CONSULTATION    PATIENT NAME: Michael Hugo                     :        1925  MED REC NO:   [id number]                          ROOM:       3115  ACCOUNT NO:   [de-identified]                           ADMIT DATE: 2022  PROVIDER:     KIMANI Crowder    DATE OF CONSULTATION:  2022    CHIEF COMPLAINT:  AFib with RVR. HISTORY OF PRESENT ILLNESS:  This is a 66-year-old female, who presented  to the emergency room with complaints of sustaining a fall 1 day prior  to admission. The patient apparently lives at home with her grandson,  but the daughter, Beata Wright, is the one that brought her in after she  visited her and noticed that she was on the floor due to some sort of  mechanical fall. She had significant pain in the right lower extremity,  so the family called family doctor's office, Dr. Lory Cheek, and he told them  to take her to the emergency room. She was seen in the ER and workup  revealed that she had a right hip fracture. She was taken for right  femur intramedullary nail aleksandra insertion by Dr. Dima Mayberry yesterday. She  apparently tolerated the surgery fairly well, went through PACU and then  was brought back to [facility] yesterday. In the early morning, it was  noted that her heart rate went very high, up into the 150s. She went in  AFib with RVR, in which she has chronic permanent AFib at baseline  regardless. She was started on IV fluids, as her potassium was also  elevated at 5.6, and her lactate was elevated at 3.2. Lactate was  rechecked this morning, it had decreased to 2.5, but her heart rate is  continuing to maintain in the 140s range. Blood pressure is currently  99/80. Her mentation is very altered at this time. She is calling for  her mother, and this is the only thing that she will say to me.   She  does have baseline dementia apparently and discussed with primary,  apparently was altered prior to her surgery yesterday as well. At this  time, she is lying in bed and is stable overall, although with  tachycardia and borderline low hypotension. PAST MEDICAL HISTORY:  AFib, CAD, CKD, GERD, hypertension, mitral valve  prolapse, and dementia. PAST SURGICAL HISTORY:  Cataract removal, cholecystectomy, fracture  surgery, hysterectomy, joint replacement, ORIF, and femur decompression. HOME MEDICATIONS:  Inderal 80 mg daily, Antivert, Atrovent, Lexapro,  Seroquel, Effexor, Lasix 20 three times a week, Colace, Prilosec, and  Ativan. SOCIAL HISTORY:  Does not smoke. She quit several years ago. Does not  drink any alcohol. FAMILY HISTORY:  Reviewed and noncontributory. REVIEW OF SYSTEMS:  A 10-point review of systems is reviewed and is  negative unless noted in the HPI. PHYSICAL EXAMINATION:  GENERAL:  Awake, but confused female, lying in bed, in no acute  distress. HEENT:  Head:  Atraumatic, normocephalic. Eyes:  No scleral erythema,  discharge, or conjunctivitis noted. NECK:  Trachea is midline. No apparent mass. CARDIAC:  Irregular rate and rhythm with holosystolic murmur heard at  the right upper sternal border. No rubs or gallops noted. LUNGS:  Clear to auscultation bilaterally. Good rise and fall of the  chest.  ABDOMEN:  Soft, nontender. MUSCULOSKELETAL:  No obvious joint deformities. SKIN:  No erythema or ecchymosis noted. NEUROLOGIC:  Awake, but not alert or oriented to self or place. PSYCHIATRIC:  Unable to assess due to her altered mental status. IMAGING STUDIES:  Chest x-ray was done yesterday in the ER, not acute. X-ray of the hip showed fracture, displaced and angulated,  intertrochanteric of the right proximal femur, tib-fib showed no acute  fracture.   CT head was done that shows no acute intracranial  abnormality, chronic microvascular ischemic changes, and old left  cerebellar infarct is stable. CT cervical spine, is no acute  abnormality of the cervical spine. 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 Last echo was done in 12/2020, and it showed  moderate-to-severe aortic stenosis. EF was preserved at that time. It  was elected not to repeat an echocardiogram outpatient with the patient  and family request, as she was not interested in having any sort of  valve intervention. Last Holter monitor was done on 11/21/2018, it  showed AFib throughout. Her average heart rate was 80 beats per minute,  isolated PVCs noted. Last stress test was done in 2016 and was normal  overall. LABS:  Potassium is elevated at 5.16, the creatinine is stable at 1.1. Her lactate has come down from 3.2 to 2.5; otherwise, overall within  normal limits. LFTs show her AST is mildly elevated at 50, but  otherwise, overall within normal limits. Her bilirubin is also a little  bit elevated at 1.3 as well. CBC shows an elevated white count at 24.6,  her hemoglobin is stable at 8.2 which is slightly lower than it was  prior to surgery, possibly because of a bleed, although she did have  chronic anemia prior to this as well, and platelets are slightly low at  107. Her platelets were low prior to surgery as well. IMPRESSION:  A 59-year-old female, who comes in after a mechanical fall  and right hip fracture, went for surgery yesterday, now has an elevated  white count, elevated lactate, and AFib with RVR, as well as borderline  hypotension. Possibly due to some sort of an infection, she also had  noted biliary and pancreatic ductal dilation as well as occlusion of the  superior mesenteric artery, unsure if this is chronic or not.   At this  time, we need to slow her rate down. We will order an echocardiogram,  if she has severe aortic stenosis, most likely, we will evaluate this  further for ejection fraction. We will give her IV metoprolol piggyback  right now. She is on [facility] [address], we will transfer her to a step-down  unit, most likely [address] , and they did not have beds in the ICU and  we will start her on a Cardizem drip to slow her down. We will watch  blood pressure closely, believe that it will start to come up, as her  heart rate comes down slightly, continue to run IV fluids at this time,  as it appears that she is dehydrated. Further treatment dictated by  hospital course.     Agree with above  Multi-organ dysfunction   Prognosis guarded  Discussed with daughter and she agree to change code status     KIMANI Bledsoe    D: 04/05/2022 8:07:13       T: 04/05/2022 8:13:21     [id number]  Job#: [id number]     Doc#: [id number]    CC: Humira Counseling:  I discussed with the patient the risks of adalimumab including but not limited to myelosuppression, immunosuppression, autoimmune hepatitis, demyelinating diseases, lymphoma, and serious infections.  The patient understands that monitoring is required including a PPD at baseline and must alert us or the primary physician if symptoms of infection or other concerning signs are noted.

## (undated) DEVICE — TUBING, SUCTION, 9/32" X 10', STRAIGHT: Brand: MEDLINE

## (undated) DEVICE — 6617 IOBAN II PATIENT ISOLATION DRAPE 5/BX,4BX/CS: Brand: STERI-DRAPE™ IOBAN™ 2

## (undated) DEVICE — GAUZE,SPONGE,2"X2",8PLY,STERILE,LF,2'S: Brand: MEDLINE

## (undated) DEVICE — GLOVE SURG SZ 8 L12IN THK75MIL DK GRN LTX FREE

## (undated) DEVICE — 3M™ STERI-DRAPE™ U-DRAPE 1015: Brand: STERI-DRAPE™

## (undated) DEVICE — SUTURE ABSORBABLE BRAIDED 2-0 CT-1 27 IN UD VICRYL J259H

## (undated) DEVICE — SYRINGE IRRIG 60ML SFT PLIABLE BLB EZ TO GRP 1 HND USE W/

## (undated) DEVICE — BANDAGE,GAUZE,BULKEE II,4.5"X4.1YD,STRL: Brand: MEDLINE

## (undated) DEVICE — ROD RMR L950MM DIA3MM W/ STR BALL TIP

## (undated) DEVICE — INTENDED FOR TISSUE SEPARATION, AND OTHER PROCEDURES THAT REQUIRE A SHARP SURGICAL BLADE TO PUNCTURE OR CUT.: Brand: BARD-PARKER ® STAINLESS STEEL BLADES

## (undated) DEVICE — Z DISCONTINUED PER MEDLINE USE 2741943 DRESSING AQUACEL 10 IN ALG W9XL25CM SIL CVR WTRPRF VIR BACT BARR ANTIMIC

## (undated) DEVICE — Z DISCONTINUED PER MEDLINE DRESSING ALG W9XL10CM SIL CVR WTRPRF VIR BACT BARR ANTIMIC

## (undated) DEVICE — BIT DRL L145MM DIA4.2MM NONSTERILE 3 FLUT NDL PNT QUIK CPL

## (undated) DEVICE — MAT FLOOR ULTRA ABS 28X48IN

## (undated) DEVICE — IMPLANTABLE DEVICE
Type: IMPLANTABLE DEVICE | Site: HIP | Status: NON-FUNCTIONAL
Removed: 2022-04-04

## (undated) DEVICE — TOWEL,OR,DSP,ST,BLUE,STD,6/PK,12PK/CS: Brand: MEDLINE

## (undated) DEVICE — SUTURE VCRL SZ 0 L27IN ABSRB UD L36MM CT-1 1/2 CIR J260H

## (undated) DEVICE — YANKAUER,FLEXIBLE HANDLE,REGLR CAPACITY: Brand: MEDLINE INDUSTRIES, INC.

## (undated) DEVICE — LINER,SEMI-RIGID,3000CC,50EA/CS: Brand: MEDLINE

## (undated) DEVICE — MARKER SURG SKIN UTIL REGULAR/FINE 2 TIP W/ RUL AND 9 LBL

## (undated) DEVICE — PACK,BASIC,IX: Brand: MEDLINE

## (undated) DEVICE — SPONGE LAP W18XL18IN WHT COT 4 PLY FLD STRUNG RADPQ DISP ST

## (undated) DEVICE — Device

## (undated) DEVICE — NEEDLE HYPO 20GA L1.5IN YEL POLYPR HUB S STL REG BVL STR

## (undated) DEVICE — PAD,ABDOMINAL,5"X9",ST,LF,25/BX: Brand: MEDLINE INDUSTRIES, INC.

## (undated) DEVICE — COTTON UNDERCAST PADDING,REGULAR FINISH: Brand: WEBRIL

## (undated) DEVICE — DRAPE SHEET ULTRAGARD: Brand: MEDLINE

## (undated) DEVICE — COUNTER NDL 30 COUNT FOAM STRP SGL MAG

## (undated) DEVICE — GUIDEWIRE ORTH L400MM DIA3.2MM FOR TFN

## (undated) DEVICE — GOWN,ECLIPSE,POLYRNF,BRTHSLV,L,30/CS: Brand: MEDLINE

## (undated) DEVICE — PENCIL ES CRD L10FT HND SWCHING ROCK SWCH W/ EDGE COAT BLDE

## (undated) DEVICE — ROD RMR L950MM DIA2.5MM W/ EXTN BALL TIP

## (undated) DEVICE — SOLUTION IV IRRIG WATER 1000ML POUR BRL 2F7114

## (undated) DEVICE — CHLORAPREP 26ML ORANGE

## (undated) DEVICE — Z DISCONTINUED USE 2744636  DRESSING AQUACEL 14 IN ALG W3.5XL14IN POLYUR FLM CVR W/ HYDRCOLL

## (undated) DEVICE — GLOVE ORANGE PI 7   MSG9070

## (undated) DEVICE — GOWN,SIRUS,POLYRNF,BRTHSLV,XLN/XL,20/CS: Brand: MEDLINE

## (undated) DEVICE — SYRINGE MED 30ML STD CLR PLAS LUERLOCK TIP N CTRL DISP

## (undated) DEVICE — ELECTRODE ES AD CRDLSS PT RET REM POLYHESIVE

## (undated) DEVICE — COVER,C-ARM,41X74: Brand: MEDLINE

## (undated) DEVICE — BANDAGE,SELF ADHRNT,COFLEX,4"X5YD,STRL: Brand: COLABEL

## (undated) DEVICE — GLOVE SURG SZ 65 THK91MIL LTX FREE SYN POLYISOPRENE

## (undated) DEVICE — GLOVE ORANGE PI 8   MSG9080

## (undated) DEVICE — DRESSING TRNSPAR W5XL4.5IN FLM SHT SEMIPERMEABLE WIND